# Patient Record
Sex: FEMALE | Race: WHITE | Employment: FULL TIME | URBAN - NONMETROPOLITAN AREA
[De-identification: names, ages, dates, MRNs, and addresses within clinical notes are randomized per-mention and may not be internally consistent; named-entity substitution may affect disease eponyms.]

---

## 2017-09-06 ENCOUNTER — HOSPITAL ENCOUNTER (OUTPATIENT)
Dept: NON INVASIVE DIAGNOSTICS | Age: 52
Discharge: HOME | End: 2017-09-06
Payer: COMMERCIAL

## 2017-09-06 PROCEDURE — 93005 ELECTROCARDIOGRAM TRACING: CPT

## 2017-09-08 LAB
EKG P AXIS: 47 DEGREES
EKG P-R INTERVAL: 148 MS
EKG Q-T INTERVAL: 345 MS
EKG QRS DURATION: 77 MS
EKG QTC CALCULATION (BAZETT): 415 MS
EKG T AXIS: 76 DEGREES

## 2017-10-10 ENCOUNTER — HOSPITAL ENCOUNTER (OUTPATIENT)
Dept: NON INVASIVE DIAGNOSTICS | Age: 52
Discharge: HOME | End: 2017-10-10
Payer: COMMERCIAL

## 2017-10-10 VITALS
HEART RATE: 99 BPM | BODY MASS INDEX: 29.28 KG/M2 | DIASTOLIC BLOOD PRESSURE: 74 MMHG | WEIGHT: 149.91 LBS | SYSTOLIC BLOOD PRESSURE: 113 MMHG

## 2017-10-10 PROCEDURE — 93350 STRESS TTE ONLY: CPT

## 2017-10-10 PROCEDURE — 6360000002 HC RX W HCPCS

## 2017-10-10 PROCEDURE — 6360000002 HC RX W HCPCS: Performed by: INTERNAL MEDICINE

## 2017-10-10 PROCEDURE — 2580000003 HC RX 258: Performed by: INTERNAL MEDICINE

## 2017-10-10 RX ORDER — SODIUM CHLORIDE 9 MG/ML
INJECTION, SOLUTION INTRAVENOUS
Status: COMPLETED | OUTPATIENT
Start: 2017-10-10 | End: 2017-10-10

## 2017-10-10 RX ORDER — ATROPINE SULFATE 0.1 MG/ML
1 INJECTION INTRAVENOUS PRN
Status: ACTIVE | OUTPATIENT
Start: 2017-10-10 | End: 2017-10-11

## 2017-10-10 RX ORDER — SODIUM CHLORIDE 0.9 % (FLUSH) 0.9 %
10 SYRINGE (ML) INJECTION PRN
Status: ACTIVE | OUTPATIENT
Start: 2017-10-10 | End: 2017-10-11

## 2017-10-10 RX ORDER — DOBUTAMINE HYDROCHLORIDE 200 MG/100ML
10 INJECTION INTRAVENOUS CONTINUOUS PRN
Status: DISCONTINUED | OUTPATIENT
Start: 2017-10-10 | End: 2017-10-10 | Stop reason: ALTCHOICE

## 2017-10-10 RX ADMIN — Medication 10 ML: at 08:46

## 2017-10-10 RX ADMIN — SODIUM CHLORIDE 250 ML: 9 INJECTION, SOLUTION INTRAVENOUS at 08:46

## 2017-10-10 RX ADMIN — DOBUTAMINE HYDROCHLORIDE 10 MCG/KG/MIN: 200 INJECTION INTRAVENOUS at 09:02

## 2017-10-10 RX ADMIN — ATROPINE SULFATE 1 MG: 0.1 INJECTION PARENTERAL at 09:10

## 2017-10-12 PROBLEM — R94.31 ABNORMAL EKG: Status: ACTIVE | Noted: 2017-10-12

## 2017-10-17 ENCOUNTER — SURG/PROC ORDERS (OUTPATIENT)
Dept: CARDIOLOGY | Age: 52
End: 2017-10-17

## 2017-10-17 ENCOUNTER — HOSPITAL ENCOUNTER (OUTPATIENT)
Dept: GENERAL RADIOLOGY | Age: 52
Discharge: HOME | End: 2017-10-17
Payer: COMMERCIAL

## 2017-10-17 ENCOUNTER — HOSPITAL ENCOUNTER (OUTPATIENT)
Dept: LAB | Age: 52
Discharge: HOME | End: 2017-10-17
Payer: COMMERCIAL

## 2017-10-17 ENCOUNTER — OFFICE VISIT (OUTPATIENT)
Dept: CARDIOLOGY | Age: 52
End: 2017-10-17
Payer: COMMERCIAL

## 2017-10-17 VITALS
WEIGHT: 144 LBS | BODY MASS INDEX: 28.27 KG/M2 | DIASTOLIC BLOOD PRESSURE: 72 MMHG | HEART RATE: 72 BPM | HEIGHT: 60 IN | SYSTOLIC BLOOD PRESSURE: 130 MMHG

## 2017-10-17 DIAGNOSIS — R94.31 ABNORMAL EKG: ICD-10-CM

## 2017-10-17 DIAGNOSIS — I10 ESSENTIAL HYPERTENSION: ICD-10-CM

## 2017-10-17 DIAGNOSIS — E78.2 MIXED HYPERLIPIDEMIA: ICD-10-CM

## 2017-10-17 DIAGNOSIS — R94.31 ABNORMAL EKG: Primary | ICD-10-CM

## 2017-10-17 DIAGNOSIS — R94.39 ABNORMAL DOBUTAMINE STRESS ECHO: Primary | ICD-10-CM

## 2017-10-17 LAB
ANION GAP SERPL CALCULATED.3IONS-SCNC: 16 MMOL/L (ref 7–19)
BASOPHILS ABSOLUTE: 0.1 K/UL (ref 0–0.2)
BASOPHILS RELATIVE PERCENT: 0.4 % (ref 0–1)
BUN BLDV-MCNC: 15 MG/DL (ref 6–20)
CALCIUM SERPL-MCNC: 9.7 MG/DL (ref 8.6–10)
CHLORIDE BLD-SCNC: 98 MMOL/L (ref 98–111)
CO2: 25 MMOL/L (ref 22–29)
CREAT SERPL-MCNC: 0.6 MG/DL (ref 0.5–0.9)
EOSINOPHILS ABSOLUTE: 0.2 K/UL (ref 0–0.6)
EOSINOPHILS RELATIVE PERCENT: 1.6 % (ref 0–5)
GFR NON-AFRICAN AMERICAN: >60
GLUCOSE BLD-MCNC: 88 MG/DL (ref 74–109)
HCT VFR BLD CALC: 42.4 % (ref 37–47)
HEMOGLOBIN: 14 G/DL (ref 12–16)
LYMPHOCYTES ABSOLUTE: 3.8 K/UL (ref 1.1–4.5)
LYMPHOCYTES RELATIVE PERCENT: 31 % (ref 20–40)
MCH RBC QN AUTO: 28.3 PG (ref 27–31)
MCHC RBC AUTO-ENTMCNC: 33 G/DL (ref 33–37)
MCV RBC AUTO: 85.8 FL (ref 81–99)
MONOCYTES ABSOLUTE: 0.7 K/UL (ref 0–0.9)
MONOCYTES RELATIVE PERCENT: 5.6 % (ref 0–10)
NEUTROPHILS ABSOLUTE: 7.5 K/UL (ref 1.5–7.5)
NEUTROPHILS RELATIVE PERCENT: 60.9 % (ref 50–65)
PDW BLD-RTO: 14 % (ref 11.5–14.5)
PLATELET # BLD: 325 K/UL (ref 130–400)
PMV BLD AUTO: 10.8 FL (ref 9.4–12.3)
POTASSIUM SERPL-SCNC: 4.2 MMOL/L (ref 3.5–5)
RBC # BLD: 4.94 M/UL (ref 4.2–5.4)
SODIUM BLD-SCNC: 139 MMOL/L (ref 136–145)
WBC # BLD: 12.3 K/UL (ref 4.8–10.8)

## 2017-10-17 PROCEDURE — 71020 XR CHEST STANDARD TWO VW: CPT

## 2017-10-17 PROCEDURE — 99205 OFFICE O/P NEW HI 60 MIN: CPT | Performed by: INTERNAL MEDICINE

## 2017-10-17 RX ORDER — ATORVASTATIN CALCIUM 40 MG/1
40 TABLET, FILM COATED ORAL DAILY
Status: ON HOLD | COMMUNITY
Start: 2017-09-24 | End: 2018-09-06

## 2017-10-17 NOTE — PROGRESS NOTES
recall    [place] EGD TRANSORAL BIOPSY SINGLE/MULTIPLE N/A 11/17/2016    Dr Iesha Arambula-Gastric ulcers, moderate chronic active duodenitis, gastritis, esophagogastritis, gastropathy     Family History   Problem Relation Age of Onset    Adopted: Yes    Colon Cancer Neg Hx     Colon Polyps Neg Hx     Esophageal Cancer Neg Hx     Stomach Cancer Neg Hx     Rectal Cancer Neg Hx     Liver Cancer Neg Hx     Liver Disease Neg Hx      Social History   Substance Use Topics    Smoking status: Current Every Day Smoker     Packs/day: 1.00     Years: 30.00     Types: Cigarettes    Smokeless tobacco: Not on file    Alcohol use No          Review of Systems:    General:      Complaint / Symptom Yes / No / Description if Yes       Fatigue No   Weight gain No   Insomnia No       Respiratory:        Complaint / Symptom Yes / No / Description if Yes       Cough No   Horseness No       Cardiovascular:    Complaint / Symptom Yes / No / Description if Yes       Chest Pain Yes: off an on   Shortness of Air / Orthopnea No   Presyncope / Syncope No   Palpitations No         Objective:    /72   Pulse 72   Ht 5' (1.524 m)   Wt 144 lb (65.3 kg)   BMI 28.12 kg/m²     GENERAL - well developed and well nourished, conversant  HEENT   PERRLA, Hearing appears normal  NECK - no thyromegaly, no JVD, trachea is in the midline  CARDIOVASCULAR  PMI is in the mid line clavicular position, Normal S1 and S2 with a grade 1/6 systolic murmur. No S3 or S4    PULMONARY  no respiratory distress. No wheezes or rales.  Lungs are clear to ausculation   ABDOMEN   soft, non tender, no rebound  MUSCULOSKELETAL   range of motion of the upper and lower extermites appears normal and equal and is without pain   EXTREMITIES - no significant edema   NEUROLOGIC  gait and station are normal  SKIN - turgor is normal  PSYCHIATRIC - normal mood and affect, alert and orientated x 3,      ASSESSMENT:    ALL THE CARDIOLOGY PROBLEMS ARE LISTED ABOVE; HOWEVER, THE FOLLOWING SPECIFIC CARDIAC PROBLEMS / CONDITIONS WERE ADDRESSED AND TREATED DURING THE OFFICE VISIT TODAY:                                                                                            MEDICAL DECISION MAKING             Cardiac Specific Problem / Diagnosis  Discussion and Data Reviewed Diagnostic Procedures Ordered Management Options Selected           1. HTN  show no change   Review and summation of old records:    Patient has a history of these risk factors, which are managed medically, and are on current oral therapy  I personally addressed, counselled and educated the patient on this problem / risk factor. I will personally continue and manage prescribed medications and monitor the course of the therapy. No Continue current medications:     Yes           2. Chest pain Are worsening   Patient is having chest pain off and on. No Continue current medications:    {Yes           3. Abnormal EKG  show no change   10/10/17 DSE Dobutamine stress echocardiogram with clinical evidence of myocardial ischemia. Yes; will schedule heart cath for Monday @ Continue current medications:       Yes         Discussed with patient. Follow Up Visit Scheduled for:  As scheduled after heart cath    I greatly appreciate the opportunity to meet Phuong Starr and your confidence in allowing me to participate in her cardiovascular care. [facility] [place], [place] am scribing for and in the presence of J. Deniece Kayser, MD,FACC.     Daryle Side, MA     11:25 AM

## 2017-10-17 NOTE — PATIENT INSTRUCTIONS
[place] at the [address], [facility] and [address] located on the first floor of [place] [address] through hospital main entrance and turn immediately to your left. Date/Time: Monday @ 7:30    Pre-operative work-up:  CBC, BMP, and Chest x-ray. Allergies:  Review of patient's allergies indicates no known allergies. Contact number:  [phone] (home)     Cardiac Catheterization Instructions   · Do not eat or drink anything after midnight on the night before your procedure. You can take your morning medications with a sip of water unless otherwise directed not to. · Bring a list of the names and dosages of all the medications you are taking. · Diabetic medication should be stopped two days prior: Metformin (glucophage), Invokana (canagliflozin), Farxiga (dapagliflozin), Jardiance (empagliflozin)   · Coumadin (warfarin) should be stopped two days prior to this procedure. · Pradaxa (dabigatran) should be stopped one day prior to procedure. · You should arrange to have someone take you home rather than drive yourself. · Further plan will depend upon the result of the cardiac catheterization. If for any reason you are unable to keep this appointment, please contact [facility], [phone], as soon as possible to reschedule.  -------------------------------------------------------------------------------------------------------------------  Cardiac Catheterization   (Coronary Angiography; Coronary Arteriography; Coronary Angiogram)   Definition:  Cardiac catheterization is a test that uses a catheter (tube) and x-ray machine to assess the heart and its blood supply. Reasons for Procedure   It is used to find the cause of symptoms, like chest pain, that could mean heart problems. Cardiac catheterization helps doctors to:    Identify narrowed or clogged arteries of the heart   Measure blood pressure within the heart   Evaluate how well the heart valves and days     Postoperative Care At the [facility]   EKG and blood studies may be done. You will likely need to lie still and flat on your back for a period of time. A pressure dressing may be placed over the area where the catheter was inserted to help prevent bleeding. It is important to follow the nurse's directions. At Home   When you return home, do the following to help ensure a smooth recovery:   Do not drive until your doctor says it is okay. Do not lift heavy objects or engage in strenuous exercise or sexual activity for at least 5-7 days. Change the dressing around the incision area as instructed. Your doctor will explain to you which medicines you can take and which ones to avoid. Take medicines as instructed. Ice may help decrease discomfort at the insertion site. You may apply the ice for 15-20 minutes each hour, for the first few days. To lower your risk for further complications of heart disease, you can make lifestyle changes. These include eating a healthier diet, exercising regularly, and managing stress. Ask your doctor about when it is safe to shower, bathe, or soak in water. Be sure to follow your doctor's instructions . After arriving home, contact your doctor if any of the following occurs:   Signs of infection, including fever and chills   Extreme sweating, nausea, or vomiting   Change in sensation to affected leg, including numbness, feeling cold, or change in color   Redness, swelling, increasing pain, excessive bleeding, or discharge at point of catheter insertion   Cough, shortness of breath, or difficulty breathing   Extreme pain   Chest pain   Drooping facial muscles   Changes in vision or speech   Difficulty walking or using your limbs   In case of an emergency, Call 911.

## 2017-10-17 NOTE — PROGRESS NOTES
[facility] [place] [address] [address], [address] [address]  Phone: [phone]  Fax: [phone] Complaint / Reason for Being Seen:  Abnormal cardiac tesing    Subjective:  Patient is referred for a abnormal DSE. Patient in the past has had abnormal EKG also. During DSE patient experienced chest pain. She has off and on chest pain still. Today a heart cath will be scheduled. Old records have been obtained from the referring providers. Those records have been reviewed and summarized. Lidya Starr is a 47 y.o. female with the following history as recorded in [facility]:  Patient Active Problem List    Diagnosis Date Noted    Abnormal EKG 10/12/2017    Acute gastric ulcer     Screening for colon cancer 09/23/2016    Chronic heartburn 09/23/2016     Current Outpatient Prescriptions   Medication Sig Dispense Refill    aspirin 81 MG tablet Take 81 mg by mouth daily      atorvastatin (LIPITOR) 40 MG tablet       omeprazole (PRILOSEC) 40 MG delayed release capsule Take 1 capsule by mouth 2 times daily (before meals) Take first thing daily on an empty stomach. 30 capsule 3    cloNIDine (CATAPRES) 0.1 MG tablet       clopidogrel (PLAVIX) 75 MG tablet       lisinopril-hydrochlorothiazide (PRINZIDE;ZESTORETIC) 20-12.5 MG per tablet       metFORMIN (GLUCOPHAGE) 500 MG tablet        No current facility-administered medications for this visit. Allergies: Review of patient's allergies indicates no known allergies.   Past Medical History:   Diagnosis Date    Anticoagulant long-term use     Diabetes mellitus ([address]     pre diabetic    Hypertension      Past Surgical History:   Procedure Laterality Date    OTHER SURGICAL HISTORY      Femoral Artery stent    ME COLONOSCOPY FLX DX W/COLLJ SPEC WHEN PFRMD N/A 11/17/2016    Dr Twyla Andersen, 10 yr recall    [place] EGD TRANSORAL BIOPSY SINGLE/MULTIPLE N/A 11/17/2016    Dr Coco Arambula-Gastric ulcers, moderate chronic active duodenitis, gastritis, esophagogastritis, gastropathy     Family History   Problem Relation Age of Onset    Adopted: Yes    Colon Cancer Neg Hx     Colon Polyps Neg Hx     Esophageal Cancer Neg Hx     Stomach Cancer Neg Hx     Rectal Cancer Neg Hx     Liver Cancer Neg Hx     Liver Disease Neg Hx      Social History   Substance Use Topics    Smoking status: Current Every Day Smoker     Packs/day: 1.00     Years: 30.00     Types: Cigarettes    Smokeless tobacco: Not on file    Alcohol use No          Review of System:      Except as noted in HPI, cardiovascular and respiratory systems are otherwise negative. Objective:    /72   Pulse 72   Ht 5' (1.524 m)   Wt 144 lb (65.3 kg)   BMI 28.12 kg/m²     GENERAL - well developed and well nourished    HEENT   PERRLA, Hearing appears normal  NECK - no thyromegaly, no JVD, trachea is in the midline  CARDIOVASCULAR  PMI is in the mid line clavicular position, Normal S1 and S2 with a grade 1/6 systolic murmur. No S3 or S4    PULMONARY  no respiratory distress. No wheezes or rales. Lungs are clear to ausculation   ABDOMEN   soft, non tender, no rebound  MUSCULOSKELETAL   range of motion of the upper and lower extermites appears normal and equal and is without pain   EXTREMITIES - no significant edema   NEUROLOGIC  gait and station are normal  SKIN - turgor is normal  PSYCHIATRIC - normal mood and affect, alert and orientated x 3,      ASSESSMENT:    ALL THE CARDIOLOGY PROBLEMS ARE LISTED ABOVE; HOWEVER, THE FOLLOWING SPECIFIC CARDIAC PROBLEMS / CONDITIONS WERE ADDRESSED AND TREATED DURING THE OFFICE VISIT TODAY:       Cardiac Specific Problem  Discussion and Plan         1. Abnormal DSE  show no change   10/10/17 DSE Dobutamine stress echocardiogram with clinical evidence of myocardial ischemia.          2. HTN  show no change   Patient has a history of these risk factors, which are managed medically, and are on current oral therapy  I personally

## 2017-10-18 RX ORDER — SODIUM CHLORIDE 9 MG/ML
INJECTION, SOLUTION INTRAVENOUS CONTINUOUS
Status: CANCELLED | OUTPATIENT
Start: 2017-10-18

## 2017-10-23 ENCOUNTER — HOSPITAL ENCOUNTER (OUTPATIENT)
Dept: CARDIAC CATH/INVASIVE PROCEDURES | Age: 52
Discharge: HOME | End: 2017-10-23
Attending: INTERNAL MEDICINE | Admitting: INTERNAL MEDICINE
Payer: COMMERCIAL

## 2017-10-23 VITALS
TEMPERATURE: 98.2 F | DIASTOLIC BLOOD PRESSURE: 71 MMHG | WEIGHT: 147 LBS | SYSTOLIC BLOOD PRESSURE: 116 MMHG | BODY MASS INDEX: 28.86 KG/M2 | HEIGHT: 60 IN | RESPIRATION RATE: 16 BRPM | HEART RATE: 77 BPM | OXYGEN SATURATION: 90 %

## 2017-10-23 DIAGNOSIS — R94.39 ABNORMAL DOBUTAMINE STRESS ECHO: ICD-10-CM

## 2017-10-23 PROBLEM — R07.89 CHEST PRESSURE: Status: ACTIVE | Noted: 2017-10-23

## 2017-10-23 PROCEDURE — 99024 POSTOP FOLLOW-UP VISIT: CPT | Performed by: INTERNAL MEDICINE

## 2017-10-23 PROCEDURE — 93458 L HRT ARTERY/VENTRICLE ANGIO: CPT | Performed by: INTERNAL MEDICINE

## 2017-10-23 PROCEDURE — 2500000003 HC RX 250 WO HCPCS

## 2017-10-23 PROCEDURE — 2720000001 HC MISC SURG SUPPLY STERILE $51-500

## 2017-10-23 PROCEDURE — C1769 GUIDE WIRE: HCPCS

## 2017-10-23 PROCEDURE — 2580000003 HC RX 258: Performed by: INTERNAL MEDICINE

## 2017-10-23 PROCEDURE — 2709999900 HC NON-CHARGEABLE SUPPLY

## 2017-10-23 PROCEDURE — C1894 INTRO/SHEATH, NON-LASER: HCPCS

## 2017-10-23 PROCEDURE — C1887 CATHETER, GUIDING: HCPCS

## 2017-10-23 PROCEDURE — 6360000002 HC RX W HCPCS

## 2017-10-23 RX ORDER — SODIUM CHLORIDE 9 MG/ML
INJECTION, SOLUTION INTRAVENOUS CONTINUOUS
Status: DISCONTINUED | OUTPATIENT
Start: 2017-10-23 | End: 2017-10-23 | Stop reason: SDUPTHER

## 2017-10-23 RX ORDER — SODIUM CHLORIDE 9 MG/ML
INJECTION, SOLUTION INTRAVENOUS CONTINUOUS
Status: ACTIVE | OUTPATIENT
Start: 2017-10-23 | End: 2017-10-23

## 2017-10-23 RX ADMIN — SODIUM CHLORIDE: 9 INJECTION, SOLUTION INTRAVENOUS at 09:35

## 2017-10-23 NOTE — H&P
[facility] [place] [address]  Phone: [phone]  Fax: [phone]        Chief Complaint / Reason for Being Seen:  Abnormal cardiac tesing     Subjective:  Patient is referred for a abnormal DSE. Patient in the past has had abnormal EKG also. During DSE patient experienced chest pain. She has off and on chest pain still. Today a heart cath will be scheduled. Old records have been obtained from the referring providers. Those records have been reviewed and summarized.       Phuong Starr is a 47 y.o. female with the following [facility] as [facility] in [facility]:       Patient Active Problem List     Diagnosis Date Noted    Abnormal EKG 10/12/2017    Acute gastric ulcer      Screening for colon cancer 09/23/2016    Chronic heartburn 09/23/2016      Current Facility-Administered Medications          Current Outpatient Prescriptions   Medication Sig Dispense Refill    aspirin 81 MG tablet Take 81 mg by mouth daily        atorvastatin (LIPITOR) 40 MG tablet          omeprazole (PRILOSEC) 40 MG delayed release capsule Take 1 capsule by mouth 2 times daily (before meals) Take first thing daily on an empty stomach. 30 capsule 3    cloNIDine (CATAPRES) 0.1 MG tablet          clopidogrel (PLAVIX) 75 MG tablet          lisinopril-hydrochlorothiazide (PRINZIDE;ZESTORETIC) 20-12.5 MG per tablet          metFORMIN (GLUCOPHAGE) 500 MG tablet            No current facility-administered medications for this visit.          Allergies: Review of patient's allergies indicates no known allergies.   Past Medical History        Past Medical History:   Diagnosis Date    Anticoagulant long-term use      Diabetes mellitus ([address]       pre diabetic    Hypertension           Past Surgical History         Past Surgical History:   Procedure Laterality Date    OTHER SURGICAL HISTORY         Femoral Artery stent    [place] COLONOSCOPY FLX DX W/COLLJ SPEC WHEN PFRMD N/A 11/17/2016     Dr Luke Mcrae, 10 yr recall    [place] EGD TRANSORAL BIOPSY SINGLE/MULTIPLE N/A 11/17/2016     Dr Nelson Arambula-Gastric ulcers, moderate chronic active duodenitis, gastritis, esophagogastritis, gastropathy         Family History         Family History   Problem Relation Age of Onset    Adopted: Yes    Colon Cancer Neg Hx      Colon Polyps Neg Hx      Esophageal Cancer Neg Hx      Stomach Cancer Neg Hx      Rectal Cancer Neg Hx      Liver Cancer Neg Hx      Liver Disease Neg Hx                 Social History   Substance Use Topics    Smoking status: Current Every Day Smoker       Packs/day: 1.00       Years: 30.00       Types: Cigarettes    Smokeless tobacco: Not on file    Alcohol use No            Review of System:        Except as noted in HPI, cardiovascular and respiratory systems are otherwise negative.        Objective:     /72   Pulse 72   Ht 5' (1.524 m)   Wt 144 lb (65.3 kg)   BMI 28.12 kg/m²      GENERAL - well developed and well nourished    HEENT -  PERRLA, Hearing appears normal  NECK - no thyromegaly, no JVD, trachea is in the midline  CARDIOVASCULAR - PMI is in the mid line clavicular position, Normal S1 and S2 with a grade 1/6 systolic murmur. No S3 or S4    PULMONARY - no respiratory distress. No wheezes or rales. Lungs are clear to ausculation   ABDOMEN  - soft, non tender, no rebound  MUSCULOSKELETAL  - range of motion of the upper and lower extermites appears normal and equal and is without pain   EXTREMITIES - no significant edema   NEUROLOGIC - gait and station are normal  SKIN - turgor is normal  PSYCHIATRIC - normal mood and affect, alert and orientated x 3,        ASSESSMENT:     ALL THE CARDIOLOGY PROBLEMS ARE LISTED ABOVE; HOWEVER, THE FOLLOWING SPECIFIC CARDIAC PROBLEMS / CONDITIONS WERE ADDRESSED AND TREATED DURING THE OFFICE VISIT TODAY:          Cardiac Specific Problem   Discussion and Plan             1.  Abnormal DSE  show no change 10/10/17 DSE Dobutamine stress echocardiogram with clinical evidence of myocardial ischemia.             2. HTN  show no change Patient has a history of these risk factors, which are managed medically, and are on current oral therapy  I personally addressed, counselled and educated the patient on this problem / risk factor. I will personally continue and manage prescribed medications and monitor the course of the therapy.             3. Chest pain in adult  are worsening Patient is still having episodes of chest pain off and on. Patient scheduled for heart cath on Monday @ 7:30 AM. Pre-op was ordered today.         PLAN:     1. Continue present medications except for changes as noted above  2. Continue to monitor rhythm  3. Further orders per clinical course.      Discussed with patient.     I greatly appreciate the opportunity to meet Phuong Starr and your confidence in allowing me to participate in her cardiovascular care.  Catalina Tsai. [facility] [place], [place] am scribing for and in the presence of AMADEO Badillo MD,[facility].     Clifford Jeffers MA      11:39 AM     I, AMADEO Badillo MD, [facility], personally performed the services described in this documentation as scribed by Clifford Jeffers in my presence, and it is both accurate and complete.     Electronically signed by AMADEO Badillo MD, [facility]     10/18/17 9:21 AM     10/23/2017       Proposed Procedure:  Selective left heart and coronary arteriography, (radial approach), 10/23/17      :  AMADEO Badillo MD    Indications:  10/10/2017  DSE Positive for clinical myocardial ischemia, discordant risk findings, AUC indication 20, AUC score 7      I have discussed the risks, benefits and options with the patient and her family. They appear to understand, have no questions, and wish to proceed. This procedure is scheduled for today.       Electronically signed by Christy Jimenez MD on 10/23/17

## 2017-10-23 NOTE — DISCHARGE SUMMARY
uneventful. She was discharged home on medical therapy with outpatient follow up. Consults:     None      Significant Diagnostic Studies:    1. Selective left heart and coronary arteriography, (radial approach), 10/23/17      Significant Therapeutic Endeavors:      1. none      Activity: activity as directed per discharge instructions. Diet:  Cardiac diet    Labs: For convenience and continuity at follow-up the following most recent labs are provided:    CBC:    Lab Results   Component Value Date    WBC 12.3 10/17/2017    HGB 14.0 10/17/2017    HCT 42.4 10/17/2017     10/17/2017       Renal:    Lab Results   Component Value Date     10/17/2017    K 4.2 10/17/2017    CL 98 10/17/2017    CO2 25 10/17/2017    BUN 15 10/17/2017    CREATININE 0.6 10/17/2017    CALCIUM 9.7 10/17/2017         Discharge Medications:     Current Discharge Medication List           Details   aspirin 81 MG tablet Take 81 mg by mouth daily      atorvastatin (LIPITOR) 40 MG tablet       omeprazole (PRILOSEC) 40 MG delayed release capsule Take 1 capsule by mouth 2 times daily (before meals) Take first thing daily on an empty stomach. Qty: 30 capsule, Refills: 3      cloNIDine (CATAPRES) 0.1 MG tablet       clopidogrel (PLAVIX) 75 MG tablet       lisinopril-hydrochlorothiazide (PRINZIDE;ZESTORETIC) 20-12.5 MG per tablet       metFORMIN (GLUCOPHAGE) 500 MG tablet                  Disposition:      1. Reassurance  2. Risk factor modification  3. Evaluation of etiologies of non cardiac chest discomfort should symptoms persist or progress  4. Follow  Up with Primary care provider as arranged  5.   Follow up with Cardiology \"prn\"       Electronically signed by Yash Dupree MD on 10/23/17

## 2018-07-16 ENCOUNTER — TELEPHONE (OUTPATIENT)
Dept: CARDIOLOGY | Age: 53
End: 2018-07-16

## 2018-07-18 ENCOUNTER — OFFICE VISIT (OUTPATIENT)
Dept: CARDIOLOGY | Age: 53
End: 2018-07-18
Payer: COMMERCIAL

## 2018-07-18 VITALS
SYSTOLIC BLOOD PRESSURE: 106 MMHG | HEIGHT: 59 IN | DIASTOLIC BLOOD PRESSURE: 62 MMHG | HEART RATE: 110 BPM | WEIGHT: 145 LBS | BODY MASS INDEX: 29.23 KG/M2

## 2018-07-18 DIAGNOSIS — F17.210 CIGARETTE NICOTINE DEPENDENCE WITHOUT COMPLICATION: ICD-10-CM

## 2018-07-18 DIAGNOSIS — I10 ESSENTIAL HYPERTENSION: ICD-10-CM

## 2018-07-18 DIAGNOSIS — R07.9 CHEST PAIN IN ADULT: Primary | ICD-10-CM

## 2018-07-18 DIAGNOSIS — E78.2 MIXED HYPERLIPIDEMIA: ICD-10-CM

## 2018-07-18 DIAGNOSIS — E11.8 TYPE 2 DIABETES MELLITUS WITH COMPLICATION, WITHOUT LONG-TERM CURRENT USE OF INSULIN (HCC): ICD-10-CM

## 2018-07-18 DIAGNOSIS — R00.0 TACHYCARDIA: ICD-10-CM

## 2018-07-18 DIAGNOSIS — I25.10 MILD CAD: ICD-10-CM

## 2018-07-18 PROCEDURE — G8427 DOCREV CUR MEDS BY ELIG CLIN: HCPCS | Performed by: CLINICAL NURSE SPECIALIST

## 2018-07-18 PROCEDURE — 99214 OFFICE O/P EST MOD 30 MIN: CPT | Performed by: CLINICAL NURSE SPECIALIST

## 2018-07-18 PROCEDURE — 93000 ELECTROCARDIOGRAM COMPLETE: CPT | Performed by: CLINICAL NURSE SPECIALIST

## 2018-07-18 PROCEDURE — 4004F PT TOBACCO SCREEN RCVD TLK: CPT | Performed by: CLINICAL NURSE SPECIALIST

## 2018-07-18 PROCEDURE — 2022F DILAT RTA XM EVC RTNOPTHY: CPT | Performed by: CLINICAL NURSE SPECIALIST

## 2018-07-18 PROCEDURE — 99406 BEHAV CHNG SMOKING 3-10 MIN: CPT | Performed by: CLINICAL NURSE SPECIALIST

## 2018-07-18 PROCEDURE — G8598 ASA/ANTIPLAT THER USED: HCPCS | Performed by: CLINICAL NURSE SPECIALIST

## 2018-07-18 PROCEDURE — G8419 CALC BMI OUT NRM PARAM NOF/U: HCPCS | Performed by: CLINICAL NURSE SPECIALIST

## 2018-07-18 PROCEDURE — 3017F COLORECTAL CA SCREEN DOC REV: CPT | Performed by: CLINICAL NURSE SPECIALIST

## 2018-07-18 PROCEDURE — 3046F HEMOGLOBIN A1C LEVEL >9.0%: CPT | Performed by: CLINICAL NURSE SPECIALIST

## 2018-07-18 RX ORDER — CITALOPRAM 10 MG/1
10 TABLET ORAL DAILY
Status: ON HOLD | COMMUNITY
End: 2018-09-06

## 2018-07-18 ASSESSMENT — ENCOUNTER SYMPTOMS
NAUSEA: 0
SHORTNESS OF BREATH: 1
BLURRED VISION: 0
HEARTBURN: 0
COUGH: 1
VOMITING: 0
ORTHOPNEA: 0

## 2018-07-18 NOTE — PROGRESS NOTES
hypertension     5. Mixed hyperlipidemia     6. Type 2 diabetes mellitus with complication, without long-term current use of insulin (HCC)     7. Cigarette nicotine dependence without complication       EKG shows sinus tachycardia rate 106    Heart Cath 10/17  Summary:     1.  Successful radial artery cardiac catheterization  2. Mild coronary artery disease  3. Left ventricular function is normal    Patient with episode of chest pain. Heart cath in October with mild CAD. Plan will be to repeat a dobutamine stress echo for reassurance that this is not myocardial ischemia. She is tachycardic today, EKG from [facility] does not show that. Reviewed records from [facility]. See the media tab. Plan will be to check a 48 hour Holter monitor to assess for any arrhythmias that may be causing chest discomfort. She is doing a good job controlling her blood pressure, cholesterol, and diabetes. Her main issue is that she continues to smoke and everyone in her family also smokes. Instructed patient in smoking cessation rationales, strategies, and available resources x 4 minutes. She would consider Chantix in the future    Plan    Orders Placed This Encounter   Procedures    Holter Monitor 48 Hour     Order Specific Question:   Reason for Exam?     Answer: Tachycardia    EKG 12 lead     Order Specific Question:   Reason for Exam?     Answer:   Chest pain    ECHO Pharmacological Stress Test     Standing Status:   Future     Standing Expiration Date:   7/18/2019     Order Specific Question:   Reason for exam:     Answer:   other chest pain     Return in about 4 weeks (around 8/15/2018) for APRN. Quit smoking. Call the [address] [place] [address] [phone]  Dobutamine Stress Echo  48 hour Holter monitor     Call with any questions or concerns  Follow up with Rubi Mahoney MD for non cardiac problems  Report any new problems  Cardiovascular Fitness-Exercise as tolerated.   Strive for 15 minutes of

## 2018-07-18 NOTE — PATIENT INSTRUCTIONS
[place] at the [address], [facility] and [address] [facility] located on the first floor of [place] [address] through hospital main entrance and turn immediately to your left. Patient's contact number:  [phone] (home)     Date/Time:     Dobutamine Stress Test    A chemical stress test uses chemical agents injected into the body through the vein. These chemicals make the heart function as if it were under stress. A chemical stress test is used when a traditional stress test (called a cardiac stress test) cannot be done. Testing will take approximately one hour. Dobutamine Stress Echocardiogram Instructions:   No caffeine 24 hours prior to the testing. This includes: coffee, pop/soda, chocolate, cold medications, etc.  Any product that might contain caffeine. Do not eat or drink anything, except water, eight (8) hours before the test.   No alcohol or nicotine twelve (12) hours prior to your test.   Wear comfortable clothing. If you are taking metoprolol, stop morning of this procedure. If you need to change this appointment, please call outpatient scheduling at [phone].

## 2018-08-01 ENCOUNTER — TELEPHONE (OUTPATIENT)
Dept: CARDIOLOGY | Age: 53
End: 2018-08-01

## 2018-08-02 DIAGNOSIS — I10 ESSENTIAL HYPERTENSION: Primary | ICD-10-CM

## 2018-08-02 RX ORDER — VARENICLINE TARTRATE 25 MG
KIT ORAL
Qty: 1 EACH | Refills: 0 | Status: SHIPPED | OUTPATIENT
Start: 2018-08-02

## 2018-08-02 RX ORDER — METOPROLOL SUCCINATE 25 MG/1
25 TABLET, EXTENDED RELEASE ORAL DAILY
Qty: 30 TABLET | Refills: 3 | Status: SHIPPED | OUTPATIENT
Start: 2018-08-02

## 2018-08-02 RX ORDER — VARENICLINE TARTRATE 1 MG/1
1 TABLET, FILM COATED ORAL 2 TIMES DAILY
Qty: 60 TABLET | Refills: 3 | Status: SHIPPED | OUTPATIENT
Start: 2018-08-02

## 2018-08-22 ENCOUNTER — HOSPITAL ENCOUNTER (OUTPATIENT)
Dept: NON INVASIVE DIAGNOSTICS | Age: 53
Discharge: HOME | End: 2018-08-22
Payer: COMMERCIAL

## 2018-08-22 ENCOUNTER — HOSPITAL ENCOUNTER (OUTPATIENT)
Dept: NUCLEAR MEDICINE | Age: 53
Discharge: HOME | End: 2018-08-24
Payer: COMMERCIAL

## 2018-08-22 DIAGNOSIS — R07.89 OTHER CHEST PAIN: ICD-10-CM

## 2018-08-22 PROCEDURE — 3430000000 HC RX DIAGNOSTIC RADIOPHARMACEUTICAL: Performed by: INTERNAL MEDICINE

## 2018-08-22 PROCEDURE — 6360000002 HC RX W HCPCS: Performed by: INTERNAL MEDICINE

## 2018-08-22 PROCEDURE — A9500 TC99M SESTAMIBI: HCPCS | Performed by: INTERNAL MEDICINE

## 2018-08-22 PROCEDURE — 93017 CV STRESS TEST TRACING ONLY: CPT

## 2018-08-22 PROCEDURE — 78452 HT MUSCLE IMAGE SPECT MULT: CPT

## 2018-08-22 RX ADMIN — TETRAKIS(2-METHOXYISOBUTYLISOCYANIDE)COPPER(I) TETRAFLUOROBORATE 30 MILLICURIE: 1 INJECTION, POWDER, LYOPHILIZED, FOR SOLUTION INTRAVENOUS at 11:37

## 2018-08-22 RX ADMIN — TETRAKIS(2-METHOXYISOBUTYLISOCYANIDE)COPPER(I) TETRAFLUOROBORATE 10 MILLICURIE: 1 INJECTION, POWDER, LYOPHILIZED, FOR SOLUTION INTRAVENOUS at 11:37

## 2018-08-22 RX ADMIN — REGADENOSON 0.4 MG: 0.08 INJECTION, SOLUTION INTRAVENOUS at 11:37

## 2018-08-23 ENCOUNTER — TELEPHONE (OUTPATIENT)
Dept: CARDIOLOGY | Age: 53
End: 2018-08-23

## 2018-08-23 LAB
LV EF: 83 %
LVEF MODALITY: NORMAL

## 2018-08-23 NOTE — TELEPHONE ENCOUNTER
Called and spoke with patient, gave results, have cath scheduled for Thursday 9/6/18 @ 0830. Gave instructions, verbally understood.

## 2018-08-29 RX ORDER — SODIUM CHLORIDE 9 MG/ML
INJECTION, SOLUTION INTRAVENOUS CONTINUOUS
Status: CANCELLED | OUTPATIENT
Start: 2018-09-06

## 2018-08-30 NOTE — TELEPHONE ENCOUNTER
Patient called to verify date and time of heart cath. I told patient this says 9/6/18 @ 0830 but patient wrote 7:00 down and would like the nurse to call her to verify time.

## 2018-08-30 NOTE — TELEPHONE ENCOUNTER
Called and spoke with patient, advised it was on Thursday 9/6/18 @ 0830 with 0700 arrival.  Patient verbally understood.

## 2018-09-06 ENCOUNTER — HOSPITAL ENCOUNTER (OUTPATIENT)
Dept: CARDIAC CATH/INVASIVE PROCEDURES | Age: 53
Discharge: HOME | End: 2018-09-06
Attending: INTERNAL MEDICINE | Admitting: INTERNAL MEDICINE
Payer: COMMERCIAL

## 2018-09-06 ENCOUNTER — APPOINTMENT (OUTPATIENT)
Dept: GENERAL RADIOLOGY | Age: 53
End: 2018-09-06
Attending: INTERNAL MEDICINE
Payer: COMMERCIAL

## 2018-09-06 VITALS
DIASTOLIC BLOOD PRESSURE: 45 MMHG | HEIGHT: 60 IN | SYSTOLIC BLOOD PRESSURE: 90 MMHG | OXYGEN SATURATION: 97 % | HEART RATE: 84 BPM | WEIGHT: 150 LBS | BODY MASS INDEX: 29.45 KG/M2 | RESPIRATION RATE: 21 BRPM | TEMPERATURE: 98.3 F

## 2018-09-06 PROBLEM — R94.39 ABNORMAL STRESS TEST: Status: ACTIVE | Noted: 2018-09-06

## 2018-09-06 LAB
ANION GAP SERPL CALCULATED.3IONS-SCNC: 12 MMOL/L (ref 7–19)
BUN BLDV-MCNC: 23 MG/DL (ref 6–20)
CALCIUM SERPL-MCNC: 9.9 MG/DL (ref 8.6–10)
CHLORIDE BLD-SCNC: 99 MMOL/L (ref 98–111)
CO2: 27 MMOL/L (ref 22–29)
CREAT SERPL-MCNC: 0.7 MG/DL (ref 0.5–0.9)
GFR NON-AFRICAN AMERICAN: >60
GLUCOSE BLD-MCNC: 143 MG/DL (ref 74–109)
HCT VFR BLD CALC: 41.2 % (ref 37–47)
HEMOGLOBIN: 13.4 G/DL (ref 12–16)
MCH RBC QN AUTO: 28.4 PG (ref 27–31)
MCHC RBC AUTO-ENTMCNC: 32.5 G/DL (ref 33–37)
MCV RBC AUTO: 87.3 FL (ref 81–99)
PDW BLD-RTO: 14.2 % (ref 11.5–14.5)
PLATELET # BLD: 277 K/UL (ref 130–400)
PMV BLD AUTO: 10.5 FL (ref 9.4–12.3)
POTASSIUM REFLEX MAGNESIUM: 5.5 MMOL/L (ref 3.5–5)
RBC # BLD: 4.72 M/UL (ref 4.2–5.4)
SODIUM BLD-SCNC: 138 MMOL/L (ref 136–145)
WBC # BLD: 12.7 K/UL (ref 4.8–10.8)

## 2018-09-06 PROCEDURE — 93458 L HRT ARTERY/VENTRICLE ANGIO: CPT | Performed by: INTERNAL MEDICINE

## 2018-09-06 PROCEDURE — 71046 X-RAY EXAM CHEST 2 VIEWS: CPT

## 2018-09-06 PROCEDURE — 99024 POSTOP FOLLOW-UP VISIT: CPT | Performed by: INTERNAL MEDICINE

## 2018-09-06 PROCEDURE — 85027 COMPLETE CBC AUTOMATED: CPT

## 2018-09-06 PROCEDURE — 2709999900 HC NON-CHARGEABLE SUPPLY

## 2018-09-06 PROCEDURE — 36415 COLL VENOUS BLD VENIPUNCTURE: CPT

## 2018-09-06 PROCEDURE — 80048 BASIC METABOLIC PNL TOTAL CA: CPT

## 2018-09-06 PROCEDURE — 2580000003 HC RX 258: Performed by: INTERNAL MEDICINE

## 2018-09-06 PROCEDURE — 2500000003 HC RX 250 WO HCPCS

## 2018-09-06 PROCEDURE — 6360000002 HC RX W HCPCS

## 2018-09-06 PROCEDURE — 99999 PR OFFICE/OUTPT VISIT,PROCEDURE ONLY: CPT | Performed by: INTERNAL MEDICINE

## 2018-09-06 PROCEDURE — C1760 CLOSURE DEV, VASC: HCPCS

## 2018-09-06 PROCEDURE — C1894 INTRO/SHEATH, NON-LASER: HCPCS

## 2018-09-06 RX ORDER — SODIUM CHLORIDE 0.9 % (FLUSH) 0.9 %
10 SYRINGE (ML) INJECTION PRN
Status: DISCONTINUED | OUTPATIENT
Start: 2018-09-06 | End: 2018-09-06 | Stop reason: HOSPADM

## 2018-09-06 RX ORDER — SODIUM CHLORIDE 0.9 % (FLUSH) 0.9 %
10 SYRINGE (ML) INJECTION EVERY 12 HOURS SCHEDULED
Status: DISCONTINUED | OUTPATIENT
Start: 2018-09-06 | End: 2018-09-06 | Stop reason: HOSPADM

## 2018-09-06 RX ORDER — SODIUM CHLORIDE 9 MG/ML
INJECTION, SOLUTION INTRAVENOUS CONTINUOUS
Status: DISCONTINUED | OUTPATIENT
Start: 2018-09-06 | End: 2018-09-06 | Stop reason: HOSPADM

## 2018-09-06 RX ORDER — ATORVASTATIN CALCIUM 40 MG/1
80 TABLET, FILM COATED ORAL DAILY
Qty: 30 TABLET | Refills: 3 | Status: SHIPPED | OUTPATIENT
Start: 2018-09-06

## 2018-09-06 RX ORDER — GARLIC 180 MG
40 TABLET, DELAYED RELEASE (ENTERIC COATED) ORAL DAILY
COMMUNITY

## 2018-09-06 RX ADMIN — SODIUM CHLORIDE: 9 INJECTION, SOLUTION INTRAVENOUS at 08:20

## 2018-09-06 NOTE — DISCHARGE SUMMARY
[facility]    Discharge Summary          I personally saw the patient and rounded with:  cath lab nurses RN, on  9/6/18      The observations documented in this note, including the assessment and plan are mine              Patient ID: Shara Eisenmenger May      Patient's PCP: Elizabeth Luciano MD    Admit Date: 9/6/2018     Discharge Date:  Matthew Oseguera MD     Admitting Physician:  Matthew Oseguera MD       Discharge Physician: Matthew Oseguera MD     Discharge Diagnoses:    1. Coronary artery disease    10/10/2017  DSE Positive for clinical myocardial ischemia, discordant risk findings, AUC indication 20, AUC score 7  10/23/17  Selective left heart and coronary arteriography, (radial approach), Mild CAD, normal LVFX   8/22/2018  lexiscan Positive for anterior myocardial ischemia, EF 83%, 15% ischemic myocardium on stress, high risk findings, AUC indication 17, AUC score 9  9/6/18  Cath 70% small diagonal, small vessels, anterior lateral hypokinesis, EF 45%    2. Hypertension  3. Hypercholesteremia  4. Cigarette use  5. Diabetes mellitus      Cardiac Specific Diagnoses:    Specialty Problems        Cardiology Problems    Essential hypertension        Mild CAD                The patient was seen and examined on day of discharge and this discharge summary is in conjunction with any daily progress note from day of discharge. History of Present Illness:     Unruly Starr is a 47 y.o. female who presents to [place] [address] with symptoms / signs / problem or diagnosis of need for cardiac catheterizaton.     She was seen in the office on 7/18/2018 by JULIO Beasley who had the following thoughts: \"Phuong is a 47 y. o. female who is here for Follow-up (Was having chest pressure with SOB Monday after work. Did go to the ED in [facility].) and Chest Pain     HPI   Patient returns to our office with an episode of chest pressure.  She went to the ER on Monday and was ruled out for MI.  She was seen in our

## 2018-09-06 NOTE — H&P
catheterization. When being examined this moring, she has had no symptoms of exertional chest discomfort, unusual or change in shortness of air, presyncope or syncope.        Family present:  Yes: two males and a female      CARDIAC RISK PROFILE:    Risk Factor Yes / No / Unknown       Gender Female   Cigarette Use Yes:    Family History of Cardiovascular Disease N/A   Diabetes Mellitus yes   Hypercholesteremia yes   Hypertension yes          Cardiac Specific Problems:    Specialty Problems        Cardiology Problems    Essential hypertension        Mild CAD                PRIOR CARDIAC PROBLEM LIST  (IF APPLICABLE):    34/50/1973  DSE Positive for clinical myocardial ischemia, discordant risk findings, AUC indication 20, AUC score 7  10/23/17  Selective left heart and coronary arteriography, (radial approach), Mild CAD, normal LVFX   8/22/2018  lexiscan Positive for anterior myocardial ischemia, EF 83%, 15% ischemic myocardium on stress, high risk findings, AUC indication 17, AUC score 9  9/6/18  Cath 70% small diagonal, small vessels, anterior lateral hypokinesis, EF 45%      Past Medical History:    Past Medical History:   Diagnosis Date    Anticoagulant long-term use     Cigarette nicotine dependence without complication 1/87/3670    Diabetes mellitus ([address]     pre diabetic    Essential hypertension 7/18/2018    GERD (gastroesophageal reflux disease)     Hyperlipidemia     Hypertension     Mild CAD 7/18/2018    Mixed hyperlipidemia 7/18/2018    Type 2 diabetes mellitus with complication, without long-term current use of insulin ([address] 7/18/2018         Past Surgical History:    Past Surgical History:   Procedure Laterality Date    OTHER SURGICAL HISTORY      Femoral Artery stent    NH COLONOSCOPY FLX DX W/COLLJ SPEC WHEN PFRMD N/A 11/17/2016    Dr Kb Ravi, 10 yr recall    NH EGD TRANSORAL BIOPSY SINGLE/MULTIPLE N/A 11/17/2016    Dr Letha Arambula-Gastric ulcers, moderate chronic active duodenitis, gastritis, esophagogastritis, gastropathy    VASCULAR SURGERY Left 2015: 2011    Stents to left leg         Home Medications:   Prior to Admission medications    Medication Sig Start Date End Date Taking? Authorizing Provider   Black Cohosh 40 MG CAPS Take 40 mg by mouth daily   Yes Historical Provider, MD   aspirin 81 MG tablet Take 81 mg by mouth daily   Yes Historical Provider, MD   atorvastatin (LIPITOR) 40 MG tablet Take 40 mg by mouth daily  9/24/17  Yes Historical Provider, MD   omeprazole (PRILOSEC) 40 MG delayed release capsule Take 1 capsule by mouth 2 times daily (before meals) Take first thing daily on an empty stomach. 11/17/16  Yes Edison Irwin MD   lisinopril-hydrochlorothiazide ROSALES [facility]) 20-12.5 MG per tablet Take 1 tablet by mouth daily  9/1/16  Yes Historical Provider, MD   metoprolol succinate (TOPROL XL) 25 MG extended release tablet Take 1 tablet by mouth daily 8/2/18   JULIO Newman   varenicline (CHANTIX STARTING MONTH PAK) 0.5 MG X 11 & 1 MG X 42 tablet Take by mouth. 8/2/18   JULIO Newman   varenicline (CHANTIX CONTINUING MONTH PAK) 1 MG tablet Take 1 tablet by mouth 2 times daily 8/2/18   JULIO Newman   metFORMIN (GLUCOPHAGE) 1000 MG tablet Take 1,000 mg by mouth 2 times daily (with meals)    Historical Provider, MD   cloNIDine (CATAPRES) 0.1 MG tablet Take 0.1 mg by mouth 2 times daily  9/1/16   Historical Provider, MD   clopidogrel (PLAVIX) 75 MG tablet Take 75 mg by mouth daily  8/26/16   Historical Provider, MD        Facility Administered Medications:    sodium chloride flush  10 mL Intravenous 2 times per day       Allergies:  Patient has no known allergies. Social History:       Social History     Social History    Marital status:      Spouse name: N/A    Number of children: N/A    Years of education: N/A     Occupational History    Not on file.      Social History Main Topics    Smoking status: Current Every Day Smoker

## 2019-06-26 ENCOUNTER — HOSPITAL ENCOUNTER (OUTPATIENT)
Dept: MRI IMAGING | Age: 54
Discharge: HOME | End: 2019-06-26
Payer: COMMERCIAL

## 2019-06-26 DIAGNOSIS — M54.16 RADICULOPATHY, LUMBAR REGION: ICD-10-CM

## 2019-06-26 DIAGNOSIS — M51.36 OTHER INTERVERTEBRAL DISC DEGENERATION, LUMBAR REGION: ICD-10-CM

## 2019-06-26 PROCEDURE — 72148 MRI LUMBAR SPINE W/O DYE: CPT

## 2024-02-13 PROBLEM — G89.29 CHRONIC LEFT-SIDED LOW BACK PAIN WITH LEFT-SIDED SCIATICA: Status: ACTIVE | Noted: 2024-02-13

## 2024-02-13 PROBLEM — M54.42 CHRONIC LEFT-SIDED LOW BACK PAIN WITH LEFT-SIDED SCIATICA: Status: ACTIVE | Noted: 2024-02-13

## 2024-02-26 DIAGNOSIS — E55.9 VITAMIN D DEFICIENCY: ICD-10-CM

## 2024-02-27 ENCOUNTER — OFFICE VISIT (OUTPATIENT)
Dept: INTERNAL MEDICINE | Facility: CLINIC | Age: 59
End: 2024-02-27
Payer: COMMERCIAL

## 2024-02-27 VITALS
OXYGEN SATURATION: 98 % | BODY MASS INDEX: 29.84 KG/M2 | SYSTOLIC BLOOD PRESSURE: 156 MMHG | HEART RATE: 86 BPM | WEIGHT: 152 LBS | TEMPERATURE: 97.8 F | HEIGHT: 60 IN | DIASTOLIC BLOOD PRESSURE: 78 MMHG

## 2024-02-27 DIAGNOSIS — Z87.891 PERSONAL HISTORY OF TOBACCO USE, PRESENTING HAZARDS TO HEALTH: ICD-10-CM

## 2024-02-27 DIAGNOSIS — E78.5 DYSLIPIDEMIA: ICD-10-CM

## 2024-02-27 DIAGNOSIS — E55.9 VITAMIN D DEFICIENCY: ICD-10-CM

## 2024-02-27 DIAGNOSIS — E11.9 TYPE 2 DIABETES MELLITUS WITHOUT COMPLICATION, WITHOUT LONG-TERM CURRENT USE OF INSULIN: ICD-10-CM

## 2024-02-27 DIAGNOSIS — Z12.31 SCREENING MAMMOGRAM FOR BREAST CANCER: Primary | ICD-10-CM

## 2024-02-27 DIAGNOSIS — Z01.419 ROUTINE GYNECOLOGICAL EXAMINATION: ICD-10-CM

## 2024-02-27 DIAGNOSIS — I10 PRIMARY HYPERTENSION: ICD-10-CM

## 2024-02-27 DIAGNOSIS — K21.9 GASTROESOPHAGEAL REFLUX DISEASE, UNSPECIFIED WHETHER ESOPHAGITIS PRESENT: ICD-10-CM

## 2024-02-27 DIAGNOSIS — Z00.00 ANNUAL PHYSICAL EXAM: Primary | ICD-10-CM

## 2024-02-27 DIAGNOSIS — Z12.2 SCREENING FOR MALIGNANT NEOPLASM OF RESPIRATORY ORGAN: ICD-10-CM

## 2024-02-27 DIAGNOSIS — R07.89 OTHER CHEST PAIN: ICD-10-CM

## 2024-02-27 RX ORDER — CLOPIDOGREL BISULFATE 75 MG/1
75 TABLET ORAL DAILY
Qty: 90 TABLET | Refills: 3 | Status: SHIPPED | OUTPATIENT
Start: 2024-02-27

## 2024-02-27 RX ORDER — ERGOCALCIFEROL 1.25 MG/1
50000 CAPSULE ORAL WEEKLY
Qty: 5 CAPSULE | Refills: 3 | Status: CANCELLED | OUTPATIENT
Start: 2024-02-27

## 2024-02-27 RX ORDER — OMEPRAZOLE 40 MG/1
40 CAPSULE, DELAYED RELEASE ORAL DAILY
Qty: 90 CAPSULE | Refills: 3 | Status: CANCELLED | OUTPATIENT
Start: 2024-02-27

## 2024-02-27 RX ORDER — FENOFIBRATE 145 MG/1
145 TABLET, COATED ORAL DAILY
Qty: 90 TABLET | Refills: 3 | Status: CANCELLED | OUTPATIENT
Start: 2024-02-27

## 2024-02-27 RX ORDER — ERGOCALCIFEROL 1.25 MG/1
50000 CAPSULE ORAL WEEKLY
Qty: 12 CAPSULE | Refills: 3 | Status: SHIPPED | OUTPATIENT
Start: 2024-02-27

## 2024-02-27 RX ORDER — LISINOPRIL AND HYDROCHLOROTHIAZIDE 20; 12.5 MG/1; MG/1
1 TABLET ORAL 2 TIMES DAILY
Qty: 180 TABLET | Refills: 3 | Status: SHIPPED | OUTPATIENT
Start: 2024-02-27

## 2024-02-27 RX ORDER — GLIPIZIDE 10 MG/1
10 TABLET ORAL
Qty: 180 TABLET | Refills: 3 | Status: SHIPPED | OUTPATIENT
Start: 2024-02-27

## 2024-02-27 RX ORDER — OMEPRAZOLE 40 MG/1
40 CAPSULE, DELAYED RELEASE ORAL DAILY
Qty: 90 CAPSULE | Refills: 3 | Status: SHIPPED | OUTPATIENT
Start: 2024-02-27

## 2024-02-27 RX ORDER — CLOPIDOGREL BISULFATE 75 MG/1
75 TABLET ORAL DAILY
Qty: 90 TABLET | Refills: 3 | Status: CANCELLED | OUTPATIENT
Start: 2024-02-27

## 2024-02-27 RX ORDER — FENOFIBRATE 145 MG/1
145 TABLET, COATED ORAL DAILY
Qty: 90 TABLET | Refills: 3 | Status: SHIPPED | OUTPATIENT
Start: 2024-02-27

## 2024-02-27 RX ORDER — LISINOPRIL AND HYDROCHLOROTHIAZIDE 20; 12.5 MG/1; MG/1
1 TABLET ORAL 2 TIMES DAILY
Qty: 180 TABLET | Refills: 3 | Status: CANCELLED | OUTPATIENT
Start: 2024-02-27

## 2024-02-27 RX ORDER — GLIPIZIDE 10 MG/1
5 TABLET ORAL
Qty: 90 TABLET | Refills: 3 | Status: CANCELLED | OUTPATIENT
Start: 2024-02-27

## 2024-02-27 RX ORDER — ERGOCALCIFEROL 1.25 MG/1
50000 CAPSULE ORAL WEEKLY
Qty: 5 CAPSULE | Refills: 3 | Status: SHIPPED | OUTPATIENT
Start: 2024-02-27

## 2024-02-27 NOTE — PROGRESS NOTES
Subjective     Chief Complaint   Patient presents with    Annual Exam       History of Present Illness    Catrachita Tse is a 58 y.o. female who presents for a annual physical at this time.  Patient states that she has been doing well overall.  Patient would like to go ahead and do a lot of her annual screening at this time.  Will check his CBC and TSH as well as her diabetic labs and lipid profile.  Patient states that overall her diabetes is not as well-controlled as she would like but she would like to see the labs before doing anything further.  Patient would like to go ahead and get set up with OB/GYN for Pap smear and mammography.    Catrachita Tse  reports that she has been smoking cigarettes. She has a 30.00 pack-year smoking history. She has never used smokeless tobacco.. I have educated her on the risk of diseases from using tobacco products such as cancer, COPD, and heart disease.     I advised her to quit and she is not willing to quit.    I spent 5 minutes counseling the patient.  Patient would like to get set up for CT lung cancer screening at this time however.    Discussed at length with this patient the benefit of taking vaccinations and their role in preventing, and/or decreasing severity of disease.  This patient has chosen not to get any vaccinations at this time.     Diabetic foot exam:   Left: Filament test present   Pulses Dorsalis Pedis:  present  Posterior Tibial:  present   Reflexes 2+    Vibratory sensation normal   Proprioception normal   Sharp/dull discrimination normal       Right: Filament test present   Pulses Dorsalis Pedis:  present  Posterior Tibial:  present   Reflexes 2+    Vibratory sensation normal   Proprioception normal   Sharp/dull discrimination normal     I discussed at preventative health care and cancer screening with her and its role in reducing types of cancer and other health problems appropriate for the patient's age.  Also discussed vaccines and current status  with the patient.  Patient understands the risks and benefits of screening and is currently up-to-date with current recommendations that she chosses.    Patient also needed refills on several medications today.  Patient reports that her GERD vitamin D deficiency hypertension and dyslipidemia have remained stable on her current meds blood pressure was moderately elevated today but has been in better shape in the past we will continue to follow this and if it remains elevated we will add additional medication.    Patient also complains today some occasional chest pain.  Patient states that she has a stinging and burning that is appeared on the left side of her chest has had a heart cath a couple years ago that was normal reviewed and new EKG EKG today which did have changes we will go ahead and get her set up with cardiology for further evaluation    Patient's PMR from outside medical facility reviewed and noted.      Past Medical History:   Past Medical History:   Diagnosis Date    Anxiety     Depression     Diabetes mellitus     GA (granuloma annulare)     GERD (gastroesophageal reflux disease)     Hypertension     PAD (peripheral artery disease)      Past Surgical History:  Past Surgical History:   Procedure Laterality Date    AORTAGRAM Left 2023    Procedure: LEFT LOWER EXTREMITY ANGIOGRAM, BALLOON ANGIOPLASTY MYNX CLOSURE;  Surgeon: Kei Jones DO;  Location: [facility] HYBRID OR 12;  Service: Vascular;  Laterality: Left;    CARDIAC CATHETERIZATION       SECTION      COLONOSCOPY N/A 2023    Procedure: COLONOSCOPY WITH ANESTHESIA;  Surgeon: Mushtaq Crews MD;  Location: [facility] ENDOSCOPY;  Service: Gastroenterology;  Laterality: N/A;  preop; diarrhea  postop; polyps; diverticulosis   PCP Ana Hernandez     ILIAC ARTERY STENT Bilateral     IR ANGIOGRAM EXTREMITY UNILATERAL       Social History:  reports that she has been smoking cigarettes. She has a 30.00 pack-year smoking history. She has  never used smokeless tobacco. She reports that she does not drink alcohol and does not use drugs.    Family History: family history includes No Known Problems in her father and mother. She was adopted.      Allergies:  No Known Allergies  Medications:  Prior to Admission medications    Medication Sig Start Date End Date Taking? Authorizing Provider   aspirin 81 MG EC tablet Take 1 tablet by mouth Daily.   Yes ProviderSanjuana MD   atorvastatin (LIPITOR) 40 MG tablet Take 1 tablet by mouth Daily. 7/17/23  Yes Ambrocio Gutierrez APRN   clopidogrel (PLAVIX) 75 MG tablet Take 1 tablet by mouth Daily. 3/28/23  Yes Ana Hernandez DO   fenofibrate (TRICOR) 145 MG tablet Take 1 tablet by mouth Daily. 3/28/23  Yes Ana Hernandez DO   fluticasone (FLONASE) 50 MCG/ACT nasal spray 1 spray into the nostril(s) as directed by provider Daily. 12/26/23  Yes William Ellis MD   glipizide (Glucotrol) 10 MG tablet Take 0.5 tablets by mouth 2 (Two) Times a Day Before Meals.  Patient taking differently: Take 1 tablet by mouth 2 (Two) Times a Day Before Meals. 7/17/23  Yes Ambrocio Gutierrez APRN   hydrOXYzine pamoate (Vistaril) 25 MG capsule Take 1 capsule by mouth 3 (Three) Times a Day As Needed for Anxiety. 9/20/23  Yes William Ellis MD   lisinopril-hydrochlorothiazide (PRINZIDE,ZESTORETIC) 20-12.5 MG per tablet Take 1 tablet by mouth 2 (Two) Times a Day. 3/28/23  Yes Ana Hernandez DO   metFORMIN (GLUCOPHAGE) 1000 MG tablet Take 1 tablet by mouth 2 (Two) Times a Day With Meals. 3/28/23  Yes Ana Hernandez DO   omeprazole (priLOSEC) 40 MG capsule Take 1 capsule by mouth Daily. 3/28/23  Yes Ana Hernandez DO   vitamin D (ERGOCALCIFEROL) 1.25 MG (87504 UT) capsule capsule Take 1 capsule by mouth 1 (One) Time Per Week. 2/27/24  Yes William Ellis MD   albuterol sulfate  (90 Base) MCG/ACT inhaler Inhale 2 puffs Every 6 (Six) Hours As Needed for Wheezing. 12/8/22 2/27/24  Yes Ana Hernandez, DO       CONY: Over the last two weeks, how often have you been bothered by the following problems?  Feeling nervous, anxious or on edge: Not at all  Not being able to stop or control worrying: Not at all  Worrying too much about different things: Not at all  Trouble Relaxing: Not at all  Being so restless that it is hard to sit still: Not at all  Becoming easily annoyed or irritable: Not at all  Feeling afraid as if something awful might happen: Not at all  CONY 7 Total Score: 0  If you checked any problems, how difficult have these problems made it for you to do your work, take care of things at home, or get along with other people: Not difficult at all      PHQ-9 Depression Screening  Little interest or pleasure in doing things? 1-->several days   Feeling down, depressed, or hopeless? 1-->several days   Trouble falling or staying asleep, or sleeping too much? 0-->not at all   Feeling tired or having little energy? 3-->nearly every day   Poor appetite or overeating? 0-->not at all   Feeling bad about yourself - or that you are a failure or have let yourself or your family down? 0-->not at all   Trouble concentrating on things, such as reading the newspaper or watching television? 0-->not at all   Moving or speaking so slowly that other people could have noticed? Or the opposite - being so fidgety or restless that you have been moving around a lot more than usual? 0-->not at all   Thoughts that you would be better off dead, or of hurting yourself in some way? 0-->not at all   PHQ-9 Total Score 5   If you checked off any problems, how difficult have these problems made it for you to do your work, take care of things at home, or get along with other people? somewhat difficult       PHQ-9 Total Score: 5   5-9 (Mild Depression)      Review of systems   negative unless otherwise specified above in HPI    Objective     Vital Signs: /78 (BP Location: Left arm, Patient Position: Sitting, Cuff  "Size: Adult)   Pulse 86   Temp 97.8 °F (36.6 °C) (Infrared)   Ht 152.4 cm (60\")   Wt 68.9 kg (152 lb)   SpO2 98%   BMI 29.69 kg/m²     Physical Exam  Vitals and nursing note reviewed.   Constitutional:       General: She is not in acute distress.     Appearance: Normal appearance.   HENT:      Head: Normocephalic.   Eyes:      Extraocular Movements: Extraocular movements intact.      Pupils: Pupils are equal, round, and reactive to light.   Cardiovascular:      Rate and Rhythm: Normal rate and regular rhythm.      Heart sounds: Normal heart sounds. No murmur heard.  Pulmonary:      Effort: Pulmonary effort is normal. No respiratory distress.      Breath sounds: Normal breath sounds. No rhonchi or rales.   Abdominal:      General: Abdomen is flat. Bowel sounds are normal.      Palpations: Abdomen is soft.   Neurological:      General: No focal deficit present.      Mental Status: She is alert.                Results Reviewed:  Glucose   Date Value Ref Range Status   10/31/2023 238 (H) 70 - 99 mg/dL Final   09/14/2023 284 (H) 65 - 99 mg/dL Final     BUN   Date Value Ref Range Status   10/31/2023 17 6 - 24 mg/dL Final   09/14/2023 19 6 - 20 mg/dL Final     Creatinine   Date Value Ref Range Status   10/31/2023 0.82 0.57 - 1.00 mg/dL Final   09/14/2023 0.73 0.57 - 1.00 mg/dL Final     Sodium   Date Value Ref Range Status   10/31/2023 135 134 - 144 mmol/L Final   09/14/2023 137 136 - 145 mmol/L Final     Potassium   Date Value Ref Range Status   10/31/2023 4.6 3.5 - 5.2 mmol/L Final   09/14/2023 4.0 3.5 - 5.2 mmol/L Final     Chloride   Date Value Ref Range Status   10/31/2023 97 96 - 106 mmol/L Final   09/14/2023 100 98 - 107 mmol/L Final     CO2   Date Value Ref Range Status   09/14/2023 23.0 22.0 - 29.0 mmol/L Final     Total CO2   Date Value Ref Range Status   10/31/2023 23 20 - 29 mmol/L Final     Calcium   Date Value Ref Range Status   10/31/2023 9.9 8.7 - 10.2 mg/dL Final   09/14/2023 9.6 8.6 - 10.5 mg/dL " Final     ALT (SGPT)   Date Value Ref Range Status   10/31/2023 29 0 - 32 IU/L Final     AST (SGOT)   Date Value Ref Range Status   10/31/2023 16 0 - 40 IU/L Final     WBC   Date Value Ref Range Status   09/14/2023 10.34 3.40 - 10.80 10*3/mm3 Final   03/28/2023 11.5 (H) 3.4 - 10.8 x10E3/uL Final     Hematocrit   Date Value Ref Range Status   09/14/2023 39.0 34.0 - 46.6 % Final     Platelets   Date Value Ref Range Status   09/14/2023 399 140 - 450 10*3/mm3 Final     Triglycerides   Date Value Ref Range Status   10/24/2023 457 (H) 0 - 149 mg/dL Final     HDL Cholesterol   Date Value Ref Range Status   10/24/2023 27 (L) >39 mg/dL Final     LDL Chol Calc (NIH)   Date Value Ref Range Status   10/24/2023 95 0 - 99 mg/dL Final     Hemoglobin A1C   Date Value Ref Range Status   10/24/2023 9.5 (H) 4.8 - 5.6 % Final     Comment:              Prediabetes: 5.7 - 6.4           Diabetes: >6.4           Glycemic control for adults with diabetes: <7.0     07/19/2023 7.8 % Final             Procedure     ECG 12 Lead    Date/Time: 2/27/2024 8:12 AM  Performed by: William Ellis MD    Authorized by: William Ellis MD  Comparison: compared with previous ECG   Comparison to previous ECG: Septal myocardial infaction of indeterminate age  Rhythm: sinus rhythm    Clinical impression: abnormal EKG           Assessment / Plan     Assessment/Plan:   Diagnosis Plan   1. Annual physical exam  CBC & Differential    TSH      2. Type 2 diabetes mellitus without complication, without long-term current use of insulin  glipizide (Glucotrol) 10 MG tablet    metFORMIN (GLUCOPHAGE) 1000 MG tablet    Microalbumin / Creatinine Urine Ratio - Urine, Clean Catch    Lipid Panel    Comprehensive Metabolic Panel    Hemoglobin A1c    TSH      3. Dyslipidemia  clopidogrel (PLAVIX) 75 MG tablet    fenofibrate (TRICOR) 145 MG tablet      4. Primary hypertension  lisinopril-hydrochlorothiazide (PRINZIDE,ZESTORETIC) 20-12.5 MG per tablet      5.  Vitamin D deficiency  vitamin D (ERGOCALCIFEROL) 1.25 MG (09084 UT) capsule capsule      6. Gastroesophageal reflux disease, unspecified whether esophagitis present  omeprazole (priLOSEC) 40 MG capsule      7. Other chest pain  ECG 12 Lead    Ambulatory Referral to Cardiology      8. Screening for malignant neoplasm of respiratory organ   CT Chest Low Dose Cancer Screening WO      9. Personal history of tobacco use, presenting hazards to health   CT Chest Low Dose Cancer Screening WO      10. Routine gynecological examination  Ambulatory Referral to Obstetrics / Gynecology            Return in about 3 months (around 5/27/2024). unless patient needs to be seen sooner or acute issues arise.      I have discussed the patient results/orders and and plan/recommendation with them at today's visit.      Signed by:    William Ellis MD Date: 02/27/24

## 2024-02-28 DIAGNOSIS — E11.65 TYPE 2 DIABETES MELLITUS WITH HYPERGLYCEMIA, WITHOUT LONG-TERM CURRENT USE OF INSULIN: Primary | ICD-10-CM

## 2024-02-28 LAB
ALBUMIN SERPL-MCNC: 4.4 G/DL (ref 3.8–4.9)
ALBUMIN/CREAT UR: <10 MG/G CREAT (ref 0–29)
ALBUMIN/GLOB SERPL: 1.6 {RATIO} (ref 1.2–2.2)
ALP SERPL-CCNC: 46 IU/L (ref 44–121)
ALT SERPL-CCNC: 31 IU/L (ref 0–32)
AST SERPL-CCNC: 20 IU/L (ref 0–40)
BASOPHILS # BLD AUTO: 0 X10E3/UL (ref 0–0.2)
BASOPHILS NFR BLD AUTO: 0 %
BILIRUB SERPL-MCNC: <0.2 MG/DL (ref 0–1.2)
BUN SERPL-MCNC: 24 MG/DL (ref 6–24)
BUN/CREAT SERPL: 28 (ref 9–23)
CALCIUM SERPL-MCNC: 10.5 MG/DL (ref 8.7–10.2)
CHLORIDE SERPL-SCNC: 96 MMOL/L (ref 96–106)
CHOLEST SERPL-MCNC: 149 MG/DL (ref 100–199)
CO2 SERPL-SCNC: 18 MMOL/L (ref 20–29)
CREAT SERPL-MCNC: 0.87 MG/DL (ref 0.57–1)
CREAT UR-MCNC: 28.7 MG/DL
EGFRCR SERPLBLD CKD-EPI 2021: 78 ML/MIN/1.73
EOSINOPHIL # BLD AUTO: 0.3 X10E3/UL (ref 0–0.4)
EOSINOPHIL NFR BLD AUTO: 3 %
ERYTHROCYTE [DISTWIDTH] IN BLOOD BY AUTOMATED COUNT: 14 % (ref 11.7–15.4)
GLOBULIN SER CALC-MCNC: 2.8 G/DL (ref 1.5–4.5)
GLUCOSE SERPL-MCNC: 229 MG/DL (ref 70–99)
HBA1C MFR BLD: 11 % (ref 4.8–5.6)
HCT VFR BLD AUTO: 39.3 % (ref 34–46.6)
HDLC SERPL-MCNC: 31 MG/DL
HGB BLD-MCNC: 13.1 G/DL (ref 11.1–15.9)
IMM GRANULOCYTES # BLD AUTO: 0.1 X10E3/UL (ref 0–0.1)
IMM GRANULOCYTES NFR BLD AUTO: 1 %
LDLC SERPL CALC-MCNC: 74 MG/DL (ref 0–99)
LYMPHOCYTES # BLD AUTO: 2.5 X10E3/UL (ref 0.7–3.1)
LYMPHOCYTES NFR BLD AUTO: 28 %
Lab: NORMAL
MCH RBC QN AUTO: 26.7 PG (ref 26.6–33)
MCHC RBC AUTO-ENTMCNC: 33.3 G/DL (ref 31.5–35.7)
MCV RBC AUTO: 80 FL (ref 79–97)
MICROALBUMIN UR-MCNC: <3 UG/ML
MONOCYTES # BLD AUTO: 0.4 X10E3/UL (ref 0.1–0.9)
MONOCYTES NFR BLD AUTO: 5 %
NEUTROPHILS # BLD AUTO: 5.5 X10E3/UL (ref 1.4–7)
NEUTROPHILS NFR BLD AUTO: 63 %
PLATELET # BLD AUTO: 382 X10E3/UL (ref 150–450)
POTASSIUM SERPL-SCNC: 5 MMOL/L (ref 3.5–5.2)
PROT SERPL-MCNC: 7.2 G/DL (ref 6–8.5)
RBC # BLD AUTO: 4.9 X10E6/UL (ref 3.77–5.28)
SODIUM SERPL-SCNC: 133 MMOL/L (ref 134–144)
TRIGL SERPL-MCNC: 270 MG/DL (ref 0–149)
TSH SERPL DL<=0.005 MIU/L-ACNC: 3.42 UIU/ML (ref 0.45–4.5)
VLDLC SERPL CALC-MCNC: 44 MG/DL (ref 5–40)
WBC # BLD AUTO: 8.8 X10E3/UL (ref 3.4–10.8)

## 2024-03-04 LAB
NCCN CRITERIA FLAG: NORMAL
TYRER CUZICK SCORE: 6.7

## 2024-03-12 ENCOUNTER — HOSPITAL ENCOUNTER (OUTPATIENT)
Dept: MRI IMAGING | Facility: HOSPITAL | Age: 59
Discharge: HOME | End: 2024-03-12
Admitting: NURSE PRACTITIONER
Payer: COMMERCIAL

## 2024-03-12 DIAGNOSIS — G89.29 CHRONIC LEFT-SIDED LOW BACK PAIN WITH LEFT-SIDED SCIATICA: ICD-10-CM

## 2024-03-12 DIAGNOSIS — M54.42 CHRONIC LEFT-SIDED LOW BACK PAIN WITH LEFT-SIDED SCIATICA: ICD-10-CM

## 2024-03-12 PROCEDURE — 72148 MRI LUMBAR SPINE W/O DYE: CPT

## 2024-03-13 ENCOUNTER — TELEPHONE (OUTPATIENT)
Dept: NEUROSURGERY | Facility: CLINIC | Age: 59
End: 2024-03-13
Payer: COMMERCIAL

## 2024-03-13 ENCOUNTER — OFFICE VISIT (OUTPATIENT)
Dept: CARDIOLOGY | Facility: CLINIC | Age: 59
End: 2024-03-13
Payer: COMMERCIAL

## 2024-03-13 VITALS
HEART RATE: 113 BPM | SYSTOLIC BLOOD PRESSURE: 120 MMHG | OXYGEN SATURATION: 99 % | WEIGHT: 150 LBS | HEIGHT: 60 IN | BODY MASS INDEX: 29.45 KG/M2 | DIASTOLIC BLOOD PRESSURE: 62 MMHG

## 2024-03-13 DIAGNOSIS — G89.29 CHRONIC LEFT-SIDED LOW BACK PAIN WITH LEFT-SIDED SCIATICA: ICD-10-CM

## 2024-03-13 DIAGNOSIS — M54.42 CHRONIC LEFT-SIDED LOW BACK PAIN WITH LEFT-SIDED SCIATICA: ICD-10-CM

## 2024-03-13 DIAGNOSIS — F17.210 CIGARETTE NICOTINE DEPENDENCE WITHOUT COMPLICATION: ICD-10-CM

## 2024-03-13 DIAGNOSIS — E78.2 MIXED HYPERLIPIDEMIA: ICD-10-CM

## 2024-03-13 DIAGNOSIS — N28.1 RENAL CYST: Primary | ICD-10-CM

## 2024-03-13 DIAGNOSIS — I25.10 MILD CAD: ICD-10-CM

## 2024-03-13 DIAGNOSIS — R06.09 DYSPNEA ON EXERTION: ICD-10-CM

## 2024-03-13 DIAGNOSIS — I20.0 UNSTABLE ANGINA: Primary | ICD-10-CM

## 2024-03-13 DIAGNOSIS — E11.8 TYPE 2 DIABETES MELLITUS WITH COMPLICATION, WITHOUT LONG-TERM CURRENT USE OF INSULIN: ICD-10-CM

## 2024-03-13 DIAGNOSIS — I73.9 PAD (PERIPHERAL ARTERY DISEASE): ICD-10-CM

## 2024-03-13 DIAGNOSIS — I10 PRIMARY HYPERTENSION: ICD-10-CM

## 2024-03-13 RX ORDER — METOPROLOL SUCCINATE 25 MG/1
25 TABLET, EXTENDED RELEASE ORAL DAILY
Qty: 90 TABLET | Refills: 3 | Status: SHIPPED | OUTPATIENT
Start: 2024-03-13

## 2024-03-13 RX ORDER — AMLODIPINE BESYLATE 10 MG/1
10 TABLET ORAL DAILY
Qty: 90 TABLET | Refills: 3 | Status: SHIPPED | OUTPATIENT
Start: 2024-03-13

## 2024-03-13 NOTE — PROGRESS NOTES
[facility] - CARDIOLOGY  New Patient Initial Outpatient Evaulation    Primary Care Physician: William Ellis MD    Subjective     Chief Complaint   Patient presents with    Chest Pain     NEW PT        History of Present Illness    Catrachita Argueta is a 58-year-old female who presents for an initial visit.    She underwent a positive stress test in 2017 which led to a heart catheterization in 2017 with Dr. Ana Hernandez at [facility] in [place], [place] and was diagnosed with coronary artery disease. She underwent another stress test in 2018 which showed anterior ischemia and a heart catheterization was suggested. She underwent another heart catheterization in 09/2018 which showed 70 percent blockage in a small diagonal branch, which was not fixed. She confirms never having any stents. She underwent a stress test in 2022 which was inconclusive.     She had not had any troubles until about 3 or 4 weeks ago, when she was coming home from [facility], and she experienced 5 or 6 sharp chest pains. It did happen again but was not as bad. She did not go to the emergency room. She mentioned it to her primary provider a few weeks ago during her annual checkup. Her doctor did an EKG in the office and compared it to the one she had previously. He did feel she had a small heart attack or cardiac episode of some kind. He referred her here to cardiology.     She does not remember what her symptoms were before her heart catheterization. According to her previous office notes, she was experiencing heavy pressure in her chest and shortness of breath. She reports that she has ongoing shortness of breath but lately it is worse. Other than the chest pains she experienced a few weeks ago, she denies any current chest pain, although she does not know what a heart attack feels like. She is not sure if she has sweating episodes with chest pain because she has hot flashes. She denies nausea.    She has comorbidities of hypertension,  hyperlipidemia, and diabetes mellitus.     Her blood pressure today is controlled today at 120/62 mmHg. Her heart rate is 113 beats per minute.     She cannot do the treadmill test because of her leg.    The patient is a smoker. She is .     The patient is currently taking atorvastatin (Lipitor) and fenofibrate (Tricor) for hyperlipidemia. She takes aspirin, Plavix, as well as lisinopril-hydrochlorothiazide for hypertension.       Review of Systems   Constitutional: Negative for diaphoresis, fever and malaise/fatigue.   HENT:  Negative for congestion.    Eyes:  Negative for vision loss in left eye and vision loss in right eye.   Cardiovascular:  Positive for chest pain. Negative for claudication, dyspnea on exertion, irregular heartbeat, leg swelling, orthopnea, palpitations and syncope.   Respiratory:  Positive for shortness of breath. Negative for cough and wheezing.    Hematologic/Lymphatic: Negative for adenopathy.   Skin:  Negative for rash.   Musculoskeletal:  Negative for joint pain and joint swelling.   Gastrointestinal:  Negative for abdominal pain, diarrhea, nausea and vomiting.   Neurological:  Negative for excessive daytime sleepiness, dizziness, focal weakness, light-headedness, numbness and weakness.   Psychiatric/Behavioral:  Negative for depression. The patient does not have insomnia.         Otherwise complete ROS reviewed and negative except as mentioned in the HPI.      Past Medical History:   Past Medical History:   Diagnosis Date    Anxiety     Depression     Diabetes mellitus     GA (granuloma annulare)     GERD (gastroesophageal reflux disease)     Hypertension     PAD (peripheral artery disease)        Past Surgical History:  Past Surgical History:   Procedure Laterality Date    AORTAGRAM Left 09/22/2023    Procedure: LEFT LOWER EXTREMITY ANGIOGRAM, BALLOON ANGIOPLASTY MYNX CLOSURE;  Surgeon: Kei Jones DO;  Location: [facility] OR ;  Service: Vascular;  Laterality: Left;     CARDIAC CATHETERIZATION       SECTION      COLONOSCOPY N/A 2023    Procedure: COLONOSCOPY WITH ANESTHESIA;  Surgeon: Mushtaq Crews MD;  Location: [facility] ENDOSCOPY;  Service: Gastroenterology;  Laterality: N/A;  preop; diarrhea  postop; polyps; diverticulosis   PCP Ana Hernandez     ILIAC ARTERY STENT Bilateral     IR ANGIOGRAM EXTREMITY UNILATERAL         Family History: family history includes No Known Problems in her father and mother. She was adopted.    Social History:  reports that she has been smoking cigarettes. She has a 30 pack-year smoking history. She has never used smokeless tobacco. She reports that she does not drink alcohol and does not use drugs.    Medications:  Prior to Admission medications    Medication Sig Start Date End Date Taking? Authorizing Provider   aspirin 81 MG EC tablet Take 1 tablet by mouth Daily.   Yes Provider, MD Sanjuana   atorvastatin (LIPITOR) 40 MG tablet Take 1 tablet by mouth Daily. 23  Yes Ambrocio Gutierrez APRN   clopidogrel (PLAVIX) 75 MG tablet Take 1 tablet by mouth Daily. 24  Yes William Ellis MD   fenofibrate (TRICOR) 145 MG tablet Take 1 tablet by mouth Daily. 24  Yes William Ellis MD   fluticasone (FLONASE) 50 MCG/ACT nasal spray 1 spray into the nostril(s) as directed by provider Daily. 23  Yes William Ellis MD   glipizide (Glucotrol) 10 MG tablet Take 1 tablet by mouth 2 (Two) Times a Day Before Meals. 24  Yes William Ellis MD   lisinopril-hydrochlorothiazide (PRINZIDE,ZESTORETIC) 20-12.5 MG per tablet Take 1 tablet by mouth 2 (Two) Times a Day. 24  Yes William Ellis MD   omeprazole (priLOSEC) 40 MG capsule Take 1 capsule by mouth Daily. 24  Yes William Ellis MD   vitamin D (ERGOCALCIFEROL) 1.25 MG (04905 UT) capsule capsule Take 1 capsule by mouth 1 (One) Time Per Week. 24  Yes William Ellis MD   vitamin D  "(ERGOCALCIFEROL) 1.25 MG (06005 UT) capsule capsule Take 1 capsule by mouth 1 (One) Time Per Week. 2/27/24  Yes William Ellis MD   amLODIPine (NORVASC) 10 MG tablet Take 1 tablet by mouth Daily. 3/13/24   Duncan Norton,    metoprolol succinate XL (TOPROL-XL) 25 MG 24 hr tablet Take 1 tablet by mouth Daily. 3/13/24   Duncan Norton,    empagliflozin (Jardiance) 10 MG tablet tablet Take 1 tablet by mouth Daily. 2/28/24 3/13/24  William Ellis MD   hydrOXYzine pamoate (Vistaril) 25 MG capsule Take 1 capsule by mouth 3 (Three) Times a Day As Needed for Anxiety. 9/20/23 3/13/24  William Ellis MD   metFORMIN (GLUCOPHAGE) 1000 MG tablet Take 1 tablet by mouth 2 (Two) Times a Day With Meals. 2/27/24 3/13/24  William Ellis MD     Allergies:  No Known Allergies    Objective     Vital Signs: /62   Pulse 113   Ht 152.4 cm (60\")   Wt 68 kg (150 lb)   SpO2 99%   BMI 29.29 kg/m²     Vitals and nursing note reviewed.   Constitutional:       Appearance: Normal and healthy appearance. Well-developed and not in distress.   Eyes:      Extraocular Movements: Extraocular movements intact.      Pupils: Pupils are equal, round, and reactive to light.   HENT:      Head: Normocephalic and atraumatic.    Mouth/Throat:      Pharynx: Oropharynx is clear.   Neck:      Vascular: JVD normal.      Trachea: Trachea normal.   Pulmonary:      Effort: Pulmonary effort is normal.      Breath sounds: Normal breath sounds. No wheezing. No rhonchi. No rales.   Cardiovascular:      PMI at left midclavicular line. Normal rate. Regular rhythm. Normal S1. Normal S2.       Murmurs: There is a grade 2/6 systolic murmur.      No gallop.  No click. No rub.   Pulses:     Dorsalis pedis: 2+ bilaterally.     Posterior tibial: 2+ bilaterally.  Abdominal:      General: Bowel sounds are normal.      Palpations: Abdomen is soft.      Tenderness: There is no abdominal tenderness. "   Musculoskeletal: Normal range of motion.      Cervical back: Normal range of motion and neck supple. Skin:     General: Skin is warm and dry.      Capillary Refill: Capillary refill takes less than 2 seconds.   Feet:      Right foot:      Skin integrity: Skin integrity normal.      Left foot:      Skin integrity: Skin integrity normal.   Neurological:      Mental Status: Alert and oriented to person, place and time.      Sensory: Sensation is intact.      Motor: Motor function is intact.      Coordination: Coordination is intact.   Psychiatric:         Speech: Speech normal.         Behavior: Behavior is cooperative.         Results Reviewed:    Lab work completed in 02/2024 was reviewed with the patient. Hemoglobin A1c was 11.0 percent. Lipid panel showed LDL cholesterol slightly elevated.     Stress test performed in 2018 was reviewed. Anterior ischemia, ejection fraction of 83 percent. Dr. Mercer suggested cardiac catheterization.      Stress test performed in 09/2022 was reviewed. Impressions are consistent with a study indicating uncertain risk according to Dr. Saldana.           Procedures      Lab Results   Component Value Date    TRIG 270 (H) 02/26/2024    HDL 31 (L) 02/26/2024    VLDL 44 (H) 02/26/2024     Lab Results   Component Value Date    HGBA1C 11.0 (H) 02/26/2024       Assessment / Plan        Problem List Items Addressed This Visit    None  Visit Diagnoses       Unstable angina    -  Primary    Relevant Medications    metoprolol succinate XL (TOPROL-XL) 25 MG 24 hr tablet    amLODIPine (NORVASC) 10 MG tablet    Other Relevant Orders    Case Request Cath Lab: Left Heart Cath (Completed)    Dyspnea on exertion        Relevant Orders    Adult Transthoracic Echo Complete W/ Cont if Necessary Per Protocol            1. Angina  2. Dyspnea     Her blood pressure is good. Her heart rate is a little high. Due to current symptoms of worsening shortness of breath as well as an anginal episode, I recommend  another heart catheterization. An order has been placed for left heart catheterization and has been scheduled for Friday, 03/22/2024. An order has been placed for transthoracic echocardiogram. She will continue taking aspirin, Plavix, Lipitor, fenofibrate (Tricor), and lisinopril-hydrochlorothiazide. I will start her on a beta blocker of metoprolol succinate (Toprol)25 mg taking 1 tablet daily and amlodipine (Norvasc) 10 mg taking 0.5 tablet daily. Prescriptions have been sent.     The patient will follow up       Transcribed from ambient dictation for Duncan Norton DO by Mayr Winter.  03/13/24   10:22 CDT    Patient or patient representative verbalized consent to the visit recording.  I have personally performed the services described in this document as transcribed by the above individual, and it is both accurate and complete.  Duncan Norton DO  3/17/2024  20:02 CDT

## 2024-03-13 NOTE — TELEPHONE ENCOUNTER
Placed a call to inform pt her physical therapy order has been faxed and to R/S pts appt N/A [id number]

## 2024-03-13 NOTE — TELEPHONE ENCOUNTER
Called patient to give her the results of the MRI of the lumbar spine.  Patient is can go for CT abdomen and pelvis with contrast to evaluate the renal cyst.  We will also get the patient set to do therapy at [facility].

## 2024-03-14 ENCOUNTER — HOSPITAL ENCOUNTER (OUTPATIENT)
Dept: MAMMOGRAPHY | Facility: HOSPITAL | Age: 59
Discharge: HOME | End: 2024-03-14
Payer: COMMERCIAL

## 2024-03-14 ENCOUNTER — HOSPITAL ENCOUNTER (OUTPATIENT)
Dept: CT IMAGING | Facility: HOSPITAL | Age: 59
Discharge: HOME | End: 2024-03-14
Payer: COMMERCIAL

## 2024-03-14 DIAGNOSIS — Z87.891 PERSONAL HISTORY OF TOBACCO USE, PRESENTING HAZARDS TO HEALTH: ICD-10-CM

## 2024-03-14 DIAGNOSIS — R93.89 ABNORMAL CHEST CT: Primary | ICD-10-CM

## 2024-03-14 DIAGNOSIS — Z12.2 SCREENING FOR MALIGNANT NEOPLASM OF RESPIRATORY ORGAN: ICD-10-CM

## 2024-03-14 DIAGNOSIS — Z12.31 SCREENING MAMMOGRAM FOR BREAST CANCER: ICD-10-CM

## 2024-03-14 PROCEDURE — 77063 BREAST TOMOSYNTHESIS BI: CPT

## 2024-03-14 PROCEDURE — 77067 SCR MAMMO BI INCL CAD: CPT

## 2024-03-14 PROCEDURE — 71271 CT THORAX LUNG CANCER SCR C-: CPT

## 2024-03-17 PROBLEM — R06.09 DYSPNEA ON EXERTION: Status: ACTIVE | Noted: 2024-03-17

## 2024-03-19 ENCOUNTER — OFFICE VISIT (OUTPATIENT)
Dept: OBSTETRICS AND GYNECOLOGY | Age: 59
End: 2024-03-19
Payer: COMMERCIAL

## 2024-03-19 VITALS
WEIGHT: 150 LBS | SYSTOLIC BLOOD PRESSURE: 116 MMHG | DIASTOLIC BLOOD PRESSURE: 74 MMHG | HEIGHT: 60 IN | BODY MASS INDEX: 29.45 KG/M2

## 2024-03-19 DIAGNOSIS — Z12.31 SCREENING MAMMOGRAM FOR BREAST CANCER: ICD-10-CM

## 2024-03-19 DIAGNOSIS — Z12.4 ENCOUNTER FOR SCREENING FOR CERVICAL CANCER: ICD-10-CM

## 2024-03-19 DIAGNOSIS — Z01.419 WELL WOMAN EXAM WITH ROUTINE GYNECOLOGICAL EXAM: Primary | ICD-10-CM

## 2024-03-19 PROCEDURE — 87624 HPV HI-RISK TYP POOLED RSLT: CPT | Performed by: OBSTETRICS & GYNECOLOGY

## 2024-03-19 PROCEDURE — G0123 SCREEN CERV/VAG THIN LAYER: HCPCS | Performed by: OBSTETRICS & GYNECOLOGY

## 2024-03-19 PROCEDURE — 99386 PREV VISIT NEW AGE 40-64: CPT | Performed by: OBSTETRICS & GYNECOLOGY

## 2024-03-19 PROCEDURE — 99459 PELVIC EXAMINATION: CPT | Performed by: OBSTETRICS & GYNECOLOGY

## 2024-03-21 LAB
GEN CATEG CVX/VAG CYTO-IMP: NORMAL
HPV I/H RISK 4 DNA CVX QL PROBE+SIG AMP: NOT DETECTED
LAB AP CASE REPORT: NORMAL
LAB AP GYN ADDITIONAL INFORMATION: NORMAL
Lab: NORMAL
PATH INTERP SPEC-IMP: NORMAL
STAT OF ADQ CVX/VAG CYTO-IMP: NORMAL

## 2024-03-22 ENCOUNTER — HOSPITAL ENCOUNTER (OUTPATIENT)
Facility: HOSPITAL | Age: 59
Setting detail: HOSPITAL OUTPATIENT SURGERY
Discharge: HOME | End: 2024-03-22
Attending: EMERGENCY MEDICINE | Admitting: EMERGENCY MEDICINE
Payer: COMMERCIAL

## 2024-03-22 VITALS
BODY MASS INDEX: 28.09 KG/M2 | DIASTOLIC BLOOD PRESSURE: 74 MMHG | HEART RATE: 64 BPM | RESPIRATION RATE: 18 BRPM | SYSTOLIC BLOOD PRESSURE: 135 MMHG | OXYGEN SATURATION: 97 % | HEIGHT: 61 IN | WEIGHT: 148.8 LBS | TEMPERATURE: 97.7 F

## 2024-03-22 DIAGNOSIS — I20.0 UNSTABLE ANGINA: ICD-10-CM

## 2024-03-22 LAB
ANION GAP SERPL CALCULATED.3IONS-SCNC: 12 MMOL/L (ref 5–15)
BUN SERPL-MCNC: 29 MG/DL (ref 6–20)
BUN/CREAT SERPL: 35.4 (ref 7–25)
CALCIUM SPEC-SCNC: 10 MG/DL (ref 8.6–10.5)
CHLORIDE SERPL-SCNC: 98 MMOL/L (ref 98–107)
CO2 SERPL-SCNC: 24 MMOL/L (ref 22–29)
CREAT SERPL-MCNC: 0.82 MG/DL (ref 0.57–1)
DEPRECATED RDW RBC AUTO: 41 FL (ref 37–54)
EGFRCR SERPLBLD CKD-EPI 2021: 83.5 ML/MIN/1.73
ERYTHROCYTE [DISTWIDTH] IN BLOOD BY AUTOMATED COUNT: 14.2 % (ref 12.3–15.4)
GLUCOSE SERPL-MCNC: 311 MG/DL (ref 65–99)
HCT VFR BLD AUTO: 39.3 % (ref 34–46.6)
HGB BLD-MCNC: 13 G/DL (ref 12–15.9)
INR PPP: 0.89 (ref 0.91–1.09)
MCH RBC QN AUTO: 26.4 PG (ref 26.6–33)
MCHC RBC AUTO-ENTMCNC: 33.1 G/DL (ref 31.5–35.7)
MCV RBC AUTO: 79.9 FL (ref 79–97)
PLATELET # BLD AUTO: 370 10*3/MM3 (ref 140–450)
PMV BLD AUTO: 11 FL (ref 6–12)
POTASSIUM SERPL-SCNC: 4.3 MMOL/L (ref 3.5–5.2)
PROTHROMBIN TIME: 12.4 SECONDS (ref 11.8–14.8)
RBC # BLD AUTO: 4.92 10*6/MM3 (ref 3.77–5.28)
SODIUM SERPL-SCNC: 134 MMOL/L (ref 136–145)
WBC NRBC COR # BLD AUTO: 8.65 10*3/MM3 (ref 3.4–10.8)

## 2024-03-22 PROCEDURE — 85610 PROTHROMBIN TIME: CPT | Performed by: EMERGENCY MEDICINE

## 2024-03-22 PROCEDURE — 25010000002 MIDAZOLAM PER 1 MG: Performed by: EMERGENCY MEDICINE

## 2024-03-22 PROCEDURE — 93458 L HRT ARTERY/VENTRICLE ANGIO: CPT | Performed by: EMERGENCY MEDICINE

## 2024-03-22 PROCEDURE — C1769 GUIDE WIRE: HCPCS | Performed by: EMERGENCY MEDICINE

## 2024-03-22 PROCEDURE — C1894 INTRO/SHEATH, NON-LASER: HCPCS | Performed by: EMERGENCY MEDICINE

## 2024-03-22 PROCEDURE — 25010000002 DIPHENHYDRAMINE PER 50 MG: Performed by: EMERGENCY MEDICINE

## 2024-03-22 PROCEDURE — 25010000002 HEPARIN (PORCINE) PER 1000 UNITS: Performed by: EMERGENCY MEDICINE

## 2024-03-22 PROCEDURE — 99153 MOD SED SAME PHYS/QHP EA: CPT | Performed by: EMERGENCY MEDICINE

## 2024-03-22 PROCEDURE — 25510000001 IOPAMIDOL PER 1 ML: Performed by: EMERGENCY MEDICINE

## 2024-03-22 PROCEDURE — 80048 BASIC METABOLIC PNL TOTAL CA: CPT | Performed by: EMERGENCY MEDICINE

## 2024-03-22 PROCEDURE — 99152 MOD SED SAME PHYS/QHP 5/>YRS: CPT | Performed by: EMERGENCY MEDICINE

## 2024-03-22 PROCEDURE — C1760 CLOSURE DEV, VASC: HCPCS | Performed by: EMERGENCY MEDICINE

## 2024-03-22 PROCEDURE — S0260 H&P FOR SURGERY: HCPCS | Performed by: EMERGENCY MEDICINE

## 2024-03-22 PROCEDURE — 25010000002 FENTANYL CITRATE (PF) 50 MCG/ML SOLUTION: Performed by: EMERGENCY MEDICINE

## 2024-03-22 PROCEDURE — 85027 COMPLETE CBC AUTOMATED: CPT | Performed by: EMERGENCY MEDICINE

## 2024-03-22 RX ORDER — VERAPAMIL HYDROCHLORIDE 2.5 MG/ML
INJECTION, SOLUTION INTRAVENOUS
Status: DISCONTINUED | OUTPATIENT
Start: 2024-03-22 | End: 2024-03-22 | Stop reason: HOSPADM

## 2024-03-22 RX ORDER — SODIUM CHLORIDE 9 MG/ML
100 INJECTION, SOLUTION INTRAVENOUS CONTINUOUS
Status: CANCELLED | OUTPATIENT
Start: 2024-03-22

## 2024-03-22 RX ORDER — MIDAZOLAM HYDROCHLORIDE 1 MG/ML
INJECTION INTRAMUSCULAR; INTRAVENOUS
Status: DISCONTINUED | OUTPATIENT
Start: 2024-03-22 | End: 2024-03-22 | Stop reason: HOSPADM

## 2024-03-22 RX ORDER — LIDOCAINE HYDROCHLORIDE 20 MG/ML
INJECTION, SOLUTION INFILTRATION; PERINEURAL
Status: DISCONTINUED | OUTPATIENT
Start: 2024-03-22 | End: 2024-03-22 | Stop reason: HOSPADM

## 2024-03-22 RX ORDER — HEPARIN SODIUM 1000 [USP'U]/ML
INJECTION, SOLUTION INTRAVENOUS; SUBCUTANEOUS
Status: DISCONTINUED | OUTPATIENT
Start: 2024-03-22 | End: 2024-03-22 | Stop reason: HOSPADM

## 2024-03-22 RX ORDER — SODIUM CHLORIDE 0.9 % (FLUSH) 0.9 %
10 SYRINGE (ML) INJECTION AS NEEDED
Status: DISCONTINUED | OUTPATIENT
Start: 2024-03-22 | End: 2024-03-22 | Stop reason: HOSPADM

## 2024-03-22 RX ORDER — ACETAMINOPHEN 325 MG/1
650 TABLET ORAL EVERY 4 HOURS PRN
Status: CANCELLED | OUTPATIENT
Start: 2024-03-22

## 2024-03-22 RX ORDER — NITROGLYCERIN 0.4 MG/1
0.4 TABLET SUBLINGUAL
Status: CANCELLED | OUTPATIENT
Start: 2024-03-22

## 2024-03-22 RX ORDER — SODIUM CHLORIDE 0.9 % (FLUSH) 0.9 %
10 SYRINGE (ML) INJECTION EVERY 12 HOURS SCHEDULED
Status: DISCONTINUED | OUTPATIENT
Start: 2024-03-22 | End: 2024-03-22 | Stop reason: HOSPADM

## 2024-03-22 RX ORDER — FENTANYL CITRATE 50 UG/ML
INJECTION, SOLUTION INTRAMUSCULAR; INTRAVENOUS
Status: DISCONTINUED | OUTPATIENT
Start: 2024-03-22 | End: 2024-03-22 | Stop reason: HOSPADM

## 2024-03-22 RX ORDER — DIPHENHYDRAMINE HYDROCHLORIDE 50 MG/ML
INJECTION INTRAMUSCULAR; INTRAVENOUS
Status: DISCONTINUED | OUTPATIENT
Start: 2024-03-22 | End: 2024-03-22 | Stop reason: HOSPADM

## 2024-03-22 NOTE — OP NOTE
[facility]    Date of procedure: 3/22/2024     Procedures performed:     1.  Access to the right radial artery via modified Seldinger technique  2.  Left heart catheterization with retrograde across the aortic valve to the left ventricle where pressures were recorded  3.  LV ventriculography  4.  Selective bilateral coronary angiography  5.  Patent hemostasis achieved to the right radial artery access site using a TR band  6.  Access to the right femoral artery via modified Seldinger technique and ultrasound guidance  7.  Patent hemostasis achieved in the right femoral artery access site using a 6 [place] Angio-Seal closure device  8.  Conscious sedation with continuous hemodynamic monitoring for 40 minutes        Risk, Benefits, and Alternatives discussed with the patient and/or family.  Plan is for moderate sedation and the patient agrees to proceed with the procedure.  An immediate assessment was done prior to the administration of moderate sedation     Indication: Unstable angina with progressively worsening episodes of typical angina and dyspnea on exertion, known nonobstructive coronary artery disease  Premedication: Versed, fentanyl  Contrast: Isovue 120 cc  Radiation: Flouro time= 8.1 minutes. Air Kerma= 307 mGy  Catheters: 6Fr JL4, 6Fr JR4, 6Fr angled pigtail, 3 DRC, TIG      Procedural details:    The patient was brought to the cath lab and prepped and draped in the usual fashion.       I supervised the administration of conscious sedation by nursing staff throughout the case.  First dose was given at 1006 and the end of my face-to-face encounter was at 1046, accounting for a total of 40 minutes of supervision.  During the case, continuous pulse oximetry, heart rate, blood pressure, and patient status were monitored.     Findings:    Hemodynamics:      Aortic: 123/70 mmHg  LV: 122/2 mmHg  LVEDP: 26 mmHg    Left ventriculography:  1. The contractility of the left ventricle is  normal.  Estimated ejection fraction 60%.  2.  No significant gradient across the aortic valve on pullback    Selective coronary angiography:     Left Main: Large-caliber vessel that bifurcates into the LAD and left circumflex coronary arteries, no angiographic evidence of stenosis, GERMAIN-3 flow    LAD: Large caliber vessel with mild disease noted in the mid segment, GERMAIN-3 flow    Diagonals: First diagonal is moderate caliber with mild disease noted in the proximal segment, second diagonal small caliber    Left circumflex: Moderate caliber vessel with mild disease noted in the proximal segment, GERMAIN-3 flow    Obtuse marginals: The first obtuse marginal branch is moderate caliber with no significant disease, the second OM is small caliber with no significant disease    RCA: Large-caliber vessel with 40 to 50% stenosis in the proximal segment, remainder the vessel has mild luminal irregularities, GERMAIN-3 flow    PDA/TRAVIS: Both are moderate caliber with no significant disease        Estimated Blood Loss: 10 cc    Specimens: None    Complications: None       Impression:  1.  Coronary artery disease as described above including a 40 to 50% stenosis in the proximal RCA with minor disease in other vessels  2.  Diabetes  3.  Hypertension  4.  Peripheral vascular disease     Plan:   1.  Monitor post procedure and discharge home later today  2.  Continue current cardiac regimen without changes  3.  Follow-up in the [facility] cardiology clinic in 3 months to continue to optimize her medical regimen    Duncan Norton,   Interventional cardiology  [facility]  March 22, 2024

## 2024-03-22 NOTE — H&P
Patient seen and examined at bedside.  The history and physical not changed as below.    We will be performing a diagnostic left heart catheterization today with possible intervention based on findings.    Risk, benefits and alternatives were explained to the patient he wished to proceed.          Chief Complaint   Patient presents with    Chest Pain       NEW PT         History of Present Illness     Catrachita Argueta is a 58-year-old female who presents for an initial visit.     She underwent a positive stress test in 2017 which led to a heart catheterization in 2017 with Dr. Ana Hernandez at [facility] in [place], [place] and was diagnosed with coronary artery disease. She underwent another stress test in 2018 which showed anterior ischemia and a heart catheterization was suggested. She underwent another heart catheterization in 09/2018 which showed 70 percent blockage in a small diagonal branch, which was not fixed. She confirms never having any stents. She underwent a stress test in 2022 which was inconclusive.      She had not had any troubles until about 3 or 4 weeks ago, when she was coming home from [facility], and she experienced 5 or 6 sharp chest pains. It did happen again but was not as bad. She did not go to the emergency room. She mentioned it to her primary provider a few weeks ago during her annual checkup. Her doctor did an EKG in the office and compared it to the one she had previously. He did feel she had a small heart attack or cardiac episode of some kind. He referred her here to cardiology.      She does not remember what her symptoms were before her heart catheterization. According to her previous office notes, she was experiencing heavy pressure in her chest and shortness of breath. She reports that she has ongoing shortness of breath but lately it is worse. Other than the chest pains she experienced a few weeks ago, she denies any current chest pain, although she does not know what a heart attack  feels like. She is not sure if she has sweating episodes with chest pain because she has hot flashes. She denies nausea.     She has comorbidities of hypertension, hyperlipidemia, and diabetes mellitus.      Her blood pressure today is controlled today at 120/62 mmHg. Her heart rate is 113 beats per minute.      She cannot do the treadmill test because of her leg.     The patient is a smoker. She is .      The patient is currently taking atorvastatin (Lipitor) and fenofibrate (Tricor) for hyperlipidemia. She takes aspirin, Plavix, as well as lisinopril-hydrochlorothiazide for hypertension.         Review of Systems   Constitutional: Negative for diaphoresis, fever and malaise/fatigue.   HENT:  Negative for congestion.    Eyes:  Negative for vision loss in left eye and vision loss in right eye.   Cardiovascular:  Positive for chest pain. Negative for claudication, dyspnea on exertion, irregular heartbeat, leg swelling, orthopnea, palpitations and syncope.   Respiratory:  Positive for shortness of breath. Negative for cough and wheezing.    Hematologic/Lymphatic: Negative for adenopathy.   Skin:  Negative for rash.   Musculoskeletal:  Negative for joint pain and joint swelling.   Gastrointestinal:  Negative for abdominal pain, diarrhea, nausea and vomiting.   Neurological:  Negative for excessive daytime sleepiness, dizziness, focal weakness, light-headedness, numbness and weakness.   Psychiatric/Behavioral:  Negative for depression. The patient does not have insomnia.          Otherwise complete ROS reviewed and negative except as mentioned in the HPI.        Past Medical History:   Medical History        Past Medical History:   Diagnosis Date    Anxiety      Depression      Diabetes mellitus      GA (granuloma annulare)      GERD (gastroesophageal reflux disease)      Hypertension      PAD (peripheral artery disease)              Past Surgical History:  Surgical History         Past Surgical History:    Procedure Laterality Date    AORTAGRAM Left 2023     Procedure: LEFT LOWER EXTREMITY ANGIOGRAM, BALLOON ANGIOPLASTY MYNX CLOSURE;  Surgeon: Kei Jones DO;  Location: [facility] HYBRID OR 12;  Service: Vascular;  Laterality: Left;    CARDIAC CATHETERIZATION         SECTION        COLONOSCOPY N/A 2023     Procedure: COLONOSCOPY WITH ANESTHESIA;  Surgeon: Mushtaq Crews MD;  Location: [facility] ENDOSCOPY;  Service: Gastroenterology;  Laterality: N/A;  preop; diarrhea  postop; polyps; diverticulosis   PCP Ana Hernandez     ILIAC ARTERY STENT Bilateral      IR ANGIOGRAM EXTREMITY UNILATERAL                Family History: family history includes No Known Problems in her father and mother. She was adopted.     Social History:  reports that she has been smoking cigarettes. She has a 30 pack-year smoking history. She has never used smokeless tobacco. She reports that she does not drink alcohol and does not use drugs.     Medications:          Prior to Admission medications    Medication Sig Start Date End Date Taking? Authorizing Provider   aspirin 81 MG EC tablet Take 1 tablet by mouth Daily.     Yes Provider, MD Sanjuana   atorvastatin (LIPITOR) 40 MG tablet Take 1 tablet by mouth Daily. 23   Yes Ambrocio Gutierrez APRN   clopidogrel (PLAVIX) 75 MG tablet Take 1 tablet by mouth Daily. 24   Yes William Ellis MD   fenofibrate (TRICOR) 145 MG tablet Take 1 tablet by mouth Daily. 24   Yes William Ellis MD   fluticasone (FLONASE) 50 MCG/ACT nasal spray 1 spray into the nostril(s) as directed by provider Daily. 23   Yes William Ellis MD   glipizide (Glucotrol) 10 MG tablet Take 1 tablet by mouth 2 (Two) Times a Day Before Meals. 24   Yes William Ellis MD   lisinopril-hydrochlorothiazide (PRINZIDE,ZESTORETIC) 20-12.5 MG per tablet Take 1 tablet by mouth 2 (Two) Times a Day. 24   Yes William Ellis MD   omeprazole  "(priLOSEC) 40 MG capsule Take 1 capsule by mouth Daily. 2/27/24   Yes William Ellis MD   vitamin D (ERGOCALCIFEROL) 1.25 MG (21882 UT) capsule capsule Take 1 capsule by mouth 1 (One) Time Per Week. 2/27/24   Yes William Ellis MD   vitamin D (ERGOCALCIFEROL) 1.25 MG (70706 UT) capsule capsule Take 1 capsule by mouth 1 (One) Time Per Week. 2/27/24   Yes William Ellis MD   amLODIPine (NORVASC) 10 MG tablet Take 1 tablet by mouth Daily. 3/13/24     Duncan Norton DO   metoprolol succinate XL (TOPROL-XL) 25 MG 24 hr tablet Take 1 tablet by mouth Daily. 3/13/24     Duncan Norton DO   empagliflozin (Jardiance) 10 MG tablet tablet Take 1 tablet by mouth Daily. 2/28/24 3/13/24   William Ellis MD   hydrOXYzine pamoate (Vistaril) 25 MG capsule Take 1 capsule by mouth 3 (Three) Times a Day As Needed for Anxiety. 9/20/23 3/13/24   William Ellis MD   metFORMIN (GLUCOPHAGE) 1000 MG tablet Take 1 tablet by mouth 2 (Two) Times a Day With Meals. 2/27/24 3/13/24   William Ellis MD      Allergies:  Allergies   No Known Allergies        Objective      Vital Signs: /62   Pulse 113   Ht 152.4 cm (60\")   Wt 68 kg (150 lb)   SpO2 99%   BMI 29.29 kg/m²      Vitals and nursing note reviewed.   Constitutional:       Appearance: Normal and healthy appearance. Well-developed and not in distress.   Eyes:      Extraocular Movements: Extraocular movements intact.      Pupils: Pupils are equal, round, and reactive to light.   HENT:      Head: Normocephalic and atraumatic.    Mouth/Throat:      Pharynx: Oropharynx is clear.   Neck:      Vascular: JVD normal.      Trachea: Trachea normal.   Pulmonary:      Effort: Pulmonary effort is normal.      Breath sounds: Normal breath sounds. No wheezing. No rhonchi. No rales.   Cardiovascular:      PMI at left midclavicular line. Normal rate. Regular rhythm. Normal S1. Normal S2.       Murmurs: There is " a grade 2/6 systolic murmur.      No gallop.  No click. No rub.   Pulses:     Dorsalis pedis: 2+ bilaterally.     Posterior tibial: 2+ bilaterally.  Abdominal:      General: Bowel sounds are normal.      Palpations: Abdomen is soft.      Tenderness: There is no abdominal tenderness.   Musculoskeletal: Normal range of motion.      Cervical back: Normal range of motion and neck supple. Skin:     General: Skin is warm and dry.      Capillary Refill: Capillary refill takes less than 2 seconds.   Feet:      Right foot:      Skin integrity: Skin integrity normal.      Left foot:      Skin integrity: Skin integrity normal.   Neurological:      Mental Status: Alert and oriented to person, place and time.      Sensory: Sensation is intact.      Motor: Motor function is intact.      Coordination: Coordination is intact.   Psychiatric:         Speech: Speech normal.         Behavior: Behavior is cooperative.            Results Reviewed:     Lab work completed in 02/2024 was reviewed with the patient. Hemoglobin A1c was 11.0 percent. Lipid panel showed LDL cholesterol slightly elevated.      Stress test performed in 2018 was reviewed. Anterior ischemia, ejection fraction of 83 percent. Dr. Mercer suggested cardiac catheterization.       Stress test performed in 09/2022 was reviewed. Impressions are consistent with a study indicating uncertain risk according to Dr. Saldana.               Procedures              Lab Results   Component Value Date     TRIG 270 (H) 02/26/2024     HDL 31 (L) 02/26/2024     VLDL 44 (H) 02/26/2024            Lab Results   Component Value Date     HGBA1C 11.0 (H) 02/26/2024         Assessment / Plan         Problem List Items Addressed This Visit    None  Visit Diagnoses         Unstable angina    -  Primary     Relevant Medications     metoprolol succinate XL (TOPROL-XL) 25 MG 24 hr tablet     amLODIPine (NORVASC) 10 MG tablet     Other Relevant Orders     Case Request Cath Lab: Left Heart Cath  (Completed)     Dyspnea on exertion         Relevant Orders     Adult Transthoracic Echo Complete W/ Cont if Necessary Per Protocol                1. Angina  2. Dyspnea     Her blood pressure is good. Her heart rate is a little high. Due to current symptoms of worsening shortness of breath as well as an anginal episode, I recommend another heart catheterization. An order has been placed for left heart catheterization and has been scheduled for Friday, 03/22/2024. An order has been placed for transthoracic echocardiogram. She will continue taking aspirin, Plavix, Lipitor, fenofibrate (Tricor), and lisinopril-hydrochlorothiazide. I will start her on a beta blocker of metoprolol succinate (Toprol)25 mg taking 1 tablet daily and amlodipine (Norvasc) 10 mg taking 0.5 tablet daily. Prescriptions have been sent.      The patient will follow up         Transcribed from ambient dictation for Duncan Norton DO by Mary Winter.  03/13/24   10:22 CDT     Patient or patient representative verbalized consent to the visit recording.  I have personally performed the services described in this document as transcribed by the above individual, and it is both accurate and complete.  Duncan Norton DO  3/17/2024  20:02 CDT

## 2024-03-25 NOTE — PROGRESS NOTES
"Subjective     Catrachita Argueta is a 58 y.o. female here for annual exam. Pt is unsure when her last pap smear was done. Denies hx abnormal pap smears. Reports menopause years ago. Denies PMB.  Is not on HRT. Denies menopausal symptoms. Denies vaginal dryness, dyspareunia, hot flashes. Denies breast changes. Is up to date on colonoscopy.  Referred by PCP for annual exam.     Gynecologic Exam  The patient's pertinent negatives include no pelvic pain or vaginal discharge. Pertinent negatives include no constipation, diarrhea, nausea or vomiting.         /74 (BP Location: Left arm, Patient Position: Sitting, Cuff Size: Adult)   Ht 152.4 cm (60\")   Wt 68 kg (150 lb)   LMP  (LMP Unknown) Comment: 2015ish  Breastfeeding No   BMI 29.29 kg/m²     Outpatient Encounter Medications as of 3/19/2024   Medication Sig Dispense Refill    amLODIPine (NORVASC) 10 MG tablet Take 1 tablet by mouth Daily. 90 tablet 3    aspirin 81 MG EC tablet Take 1 tablet by mouth Daily.      atorvastatin (LIPITOR) 40 MG tablet Take 1 tablet by mouth Daily. 90 tablet 3    clopidogrel (PLAVIX) 75 MG tablet Take 1 tablet by mouth Daily. 90 tablet 3    fenofibrate (TRICOR) 145 MG tablet Take 1 tablet by mouth Daily. 90 tablet 3    fluticasone (FLONASE) 50 MCG/ACT nasal spray 1 spray into the nostril(s) as directed by provider Daily. 16 g 11    glipizide (Glucotrol) 10 MG tablet Take 1 tablet by mouth 2 (Two) Times a Day Before Meals. 180 tablet 3    lisinopril-hydrochlorothiazide (PRINZIDE,ZESTORETIC) 20-12.5 MG per tablet Take 1 tablet by mouth 2 (Two) Times a Day. 180 tablet 3    metFORMIN (GLUCOPHAGE) 1000 MG tablet Take 1 tablet by mouth 2 (Two) Times a Day With Meals.      metoprolol succinate XL (TOPROL-XL) 25 MG 24 hr tablet Take 1 tablet by mouth Daily. 90 tablet 3    omeprazole (priLOSEC) 40 MG capsule Take 1 capsule by mouth Daily. 90 capsule 3    vitamin D (ERGOCALCIFEROL) 1.25 MG (31454 UT) capsule capsule Take 1 capsule by mouth 1 (One) " Time Per Week. 5 capsule 3    [DISCONTINUED] vitamin D (ERGOCALCIFEROL) 1.25 MG (23040 UT) capsule capsule Take 1 capsule by mouth 1 (One) Time Per Week. 12 capsule 3     No facility-administered encounter medications on file as of 3/19/2024.       Surgical History  Past Surgical History:   Procedure Laterality Date    AORTAGRAM Left 2023    Procedure: LEFT LOWER EXTREMITY ANGIOGRAM, BALLOON ANGIOPLASTY MYNX CLOSURE;  Surgeon: Kei Jones DO;  Location: [facility] HYBRID OR 12;  Service: Vascular;  Laterality: Left;    CARDIAC CATHETERIZATION      CARDIAC CATHETERIZATION N/A 3/22/2024    Procedure: Left Heart Cath;  Surgeon: Duncan Norton DO;  Location: [facility] CATH INVASIVE LOCATION;  Service: Cardiovascular;  Laterality: N/A;     SECTION      COLONOSCOPY N/A 2023    Procedure: COLONOSCOPY WITH ANESTHESIA;  Surgeon: Mushtaq Crews MD;  Location: [facility] ENDOSCOPY;  Service: Gastroenterology;  Laterality: N/A;  preop; diarrhea  postop; polyps; diverticulosis   PCP Ana Hernandez     ILIAC ARTERY STENT Bilateral     IR ANGIOGRAM EXTREMITY UNILATERAL         Family History  Family History   Adopted: Yes   Problem Relation Age of Onset    No Known Problems Mother     No Known Problems Father        The following portions of the patient's history were reviewed and updated as appropriate: allergies, current medications, past family history, past medical history, past social history, past surgical history, and problem list.    Review of Systems   Constitutional:  Negative for activity change, appetite change, fatigue, unexpected weight gain and unexpected weight loss.   Respiratory:  Negative for cough, chest tightness, shortness of breath and wheezing.    Cardiovascular:  Negative for chest pain and palpitations.   Gastrointestinal:  Negative for constipation, diarrhea, nausea and vomiting.   Genitourinary:  Positive for amenorrhea. Negative for breast discharge, breast lump, breast  pain, dyspareunia, pelvic pain, pelvic pressure, vaginal bleeding, vaginal discharge and vaginal pain.       Objective   Physical Exam  Exam conducted with a chaperone present.   Constitutional:       Appearance: Normal appearance.   Cardiovascular:      Rate and Rhythm: Normal rate and regular rhythm.      Pulses: Normal pulses.   Pulmonary:      Effort: Pulmonary effort is normal.      Breath sounds: Normal breath sounds.   Chest:   Breasts:     Right: Normal. No inverted nipple, mass, nipple discharge or skin change.      Left: Normal. No inverted nipple, mass, nipple discharge or skin change.   Abdominal:      General: Abdomen is flat. Bowel sounds are normal.      Palpations: Abdomen is soft.   Genitourinary:     Comments: Normal external genitalia, vaginal mucosa mildly atrophic without lesions, cervix smooth without lesions, no prolapse, bimanual exam with anteverted uterus, normal size, no masses, no CMT, nontender, no vaginal discharge  Musculoskeletal:      Cervical back: Normal range of motion and neck supple.   Neurological:      Mental Status: She is alert.   Psychiatric:         Mood and Affect: Mood normal.         Behavior: Behavior normal.         Assessment & Plan   Diagnoses and all orders for this visit:    1. Well woman exam with routine gynecological exam (Primary)  59 y/o CF here for annual exam. Pap smear today. Normal breast and pelvic exam today.   2. Encounter for screening for cervical cancer  -     Liquid-based Pap Smear, Screening  -     HPV DNA Probe, Direct - ThinPrep Vial, Cervix, Endocervix  Pap smear today  3. Screening mammogram for breast cancer  -     Mammo Screening Digital Tomosynthesis Bilateral With CAD; Future        BMI Body mass index is 29.29 kg/m²..   Colonoscopy: Up to date  Mammogram: Ordered today  DEXA:  not applicable  Pap smear, per ASCCP guidelines, Done today  STI screening: declines  Contraception: menoapausal    AVS/Patient education handout on the following  as well w/discussion  Encouraged self breast awareness.  Encouraged proactive weight management and importance of maintaining a healthy weight.   Encouraged regular exercise and the importance of same, in regards to a healthy heart as well as helping to maintain her weight and improving her mental health.            Elisa Harris MD  4/7/2024

## 2024-03-26 ENCOUNTER — HOSPITAL ENCOUNTER (OUTPATIENT)
Dept: CT IMAGING | Facility: HOSPITAL | Age: 59
Discharge: HOME | End: 2024-03-26
Admitting: NURSE PRACTITIONER
Payer: COMMERCIAL

## 2024-03-26 DIAGNOSIS — N28.1 RENAL CYST: ICD-10-CM

## 2024-03-26 LAB — CREAT BLDA-MCNC: 0.7 MG/DL (ref 0.6–1.3)

## 2024-03-26 PROCEDURE — 82565 ASSAY OF CREATININE: CPT

## 2024-03-26 PROCEDURE — 25510000001 IOPAMIDOL 61 % SOLUTION: Performed by: NURSE PRACTITIONER

## 2024-03-26 PROCEDURE — 74178 CT ABD&PLV WO CNTR FLWD CNTR: CPT

## 2024-03-26 RX ADMIN — IOPAMIDOL 100 ML: 612 INJECTION, SOLUTION INTRAVENOUS at 14:43

## 2024-04-12 ENCOUNTER — HOSPITAL ENCOUNTER (OUTPATIENT)
Dept: MRI IMAGING | Facility: HOSPITAL | Age: 59
Discharge: HOME | End: 2024-04-12
Admitting: FAMILY MEDICINE
Payer: COMMERCIAL

## 2024-04-12 DIAGNOSIS — R93.89 ABNORMAL CHEST CT: ICD-10-CM

## 2024-04-12 LAB — CREAT BLDA-MCNC: 0.8 MG/DL (ref 0.6–1.3)

## 2024-04-12 PROCEDURE — 82565 ASSAY OF CREATININE: CPT

## 2024-04-12 PROCEDURE — A9577 INJ MULTIHANCE: HCPCS | Performed by: FAMILY MEDICINE

## 2024-04-12 PROCEDURE — 71552 MRI CHEST W/O & W/DYE: CPT

## 2024-04-12 PROCEDURE — 0 GADOBENATE DIMEGLUMINE 529 MG/ML SOLUTION: Performed by: FAMILY MEDICINE

## 2024-04-12 RX ADMIN — GADOBENATE DIMEGLUMINE 14 ML: 529 INJECTION, SOLUTION INTRAVENOUS at 12:21

## 2024-05-03 ENCOUNTER — HOSPITAL ENCOUNTER (OUTPATIENT)
Dept: CARDIOLOGY | Facility: HOSPITAL | Age: 59
Discharge: HOME | End: 2024-05-03
Payer: COMMERCIAL

## 2024-05-03 VITALS
DIASTOLIC BLOOD PRESSURE: 74 MMHG | SYSTOLIC BLOOD PRESSURE: 116 MMHG | BODY MASS INDEX: 27.94 KG/M2 | WEIGHT: 148 LBS | HEIGHT: 61 IN

## 2024-05-03 DIAGNOSIS — R06.09 DYSPNEA ON EXERTION: ICD-10-CM

## 2024-05-03 LAB
BH CV ECHO MEAS - AO MAX PG: 6.3 MMHG
BH CV ECHO MEAS - AO MEAN PG: 3 MMHG
BH CV ECHO MEAS - AO ROOT DIAM: 2.2 CM
BH CV ECHO MEAS - AO V2 MAX: 125 CM/SEC
BH CV ECHO MEAS - AO V2 VTI: 23.6 CM
BH CV ECHO MEAS - AVA(I,D): 2.34 CM2
BH CV ECHO MEAS - EDV(CUBED): 91.1 ML
BH CV ECHO MEAS - EDV(MOD-SP2): 37.7 ML
BH CV ECHO MEAS - EDV(MOD-SP4): 31.2 ML
BH CV ECHO MEAS - EF(MOD-BP): 63.7 %
BH CV ECHO MEAS - EF(MOD-SP2): 62.3 %
BH CV ECHO MEAS - EF(MOD-SP4): 64.1 %
BH CV ECHO MEAS - ESV(CUBED): 38.6 ML
BH CV ECHO MEAS - ESV(MOD-SP2): 14.2 ML
BH CV ECHO MEAS - ESV(MOD-SP4): 11.2 ML
BH CV ECHO MEAS - FS: 24.9 %
BH CV ECHO MEAS - IVS/LVPW: 0.96 CM
BH CV ECHO MEAS - IVSD: 0.74 CM
BH CV ECHO MEAS - LA DIMENSION: 3 CM
BH CV ECHO MEAS - LAT PEAK E' VEL: 9.8 CM/SEC
BH CV ECHO MEAS - LV DIASTOLIC VOL/BSA (35-75): 18.8 CM2
BH CV ECHO MEAS - LV MASS(C)D: 104.7 GRAMS
BH CV ECHO MEAS - LV MAX PG: 2.9 MMHG
BH CV ECHO MEAS - LV MEAN PG: 2 MMHG
BH CV ECHO MEAS - LV SYSTOLIC VOL/BSA (12-30): 6.7 CM2
BH CV ECHO MEAS - LV V1 MAX: 84.8 CM/SEC
BH CV ECHO MEAS - LV V1 VTI: 17.6 CM
BH CV ECHO MEAS - LVIDD: 4.5 CM
BH CV ECHO MEAS - LVIDS: 3.4 CM
BH CV ECHO MEAS - LVOT AREA: 3.1 CM2
BH CV ECHO MEAS - LVOT DIAM: 2 CM
BH CV ECHO MEAS - LVPWD: 0.77 CM
BH CV ECHO MEAS - MED PEAK E' VEL: 9.6 CM/SEC
BH CV ECHO MEAS - MV A MAX VEL: 79.5 CM/SEC
BH CV ECHO MEAS - MV DEC TIME: 0.15 SEC
BH CV ECHO MEAS - MV E MAX VEL: 85.6 CM/SEC
BH CV ECHO MEAS - MV E/A: 1.08
BH CV ECHO MEAS - PA V2 MAX: 108.5 CM/SEC
BH CV ECHO MEAS - RAP SYSTOLE: 8 MMHG
BH CV ECHO MEAS - RVSP: 25 MMHG
BH CV ECHO MEAS - SV(LVOT): 55.3 ML
BH CV ECHO MEAS - SV(MOD-SP2): 23.5 ML
BH CV ECHO MEAS - SV(MOD-SP4): 20 ML
BH CV ECHO MEAS - SVI(LVOT): 33.3 ML/M2
BH CV ECHO MEAS - SVI(MOD-SP2): 14.1 ML/M2
BH CV ECHO MEAS - SVI(MOD-SP4): 12 ML/M2
BH CV ECHO MEAS - TAPSE (>1.6): 2.13 CM
BH CV ECHO MEAS - TR MAX PG: 17 MMHG
BH CV ECHO MEAS - TR MAX VEL: 206 CM/SEC
BH CV ECHO MEASUREMENTS AVERAGE E/E' RATIO: 8.82
BH CV XLRA - TDI S': 11.3 CM/SEC
LEFT ATRIUM VOLUME INDEX: 20.1 ML/M2

## 2024-05-03 PROCEDURE — 93306 TTE W/DOPPLER COMPLETE: CPT

## 2024-05-17 ENCOUNTER — HOSPITAL ENCOUNTER (OUTPATIENT)
Dept: ULTRASOUND IMAGING | Facility: HOSPITAL | Age: 59
Discharge: HOME | End: 2024-05-17
Payer: COMMERCIAL

## 2024-05-22 ENCOUNTER — LAB (OUTPATIENT)
Dept: INTERNAL MEDICINE | Facility: CLINIC | Age: 59
End: 2024-05-22
Payer: COMMERCIAL

## 2024-05-22 DIAGNOSIS — E11.9 TYPE 2 DIABETES MELLITUS WITHOUT COMPLICATION, WITHOUT LONG-TERM CURRENT USE OF INSULIN: ICD-10-CM

## 2024-05-22 DIAGNOSIS — E11.9 TYPE 2 DIABETES MELLITUS WITHOUT COMPLICATION, WITHOUT LONG-TERM CURRENT USE OF INSULIN: Primary | ICD-10-CM

## 2024-05-23 ENCOUNTER — OFFICE VISIT (OUTPATIENT)
Dept: NEUROSURGERY | Facility: CLINIC | Age: 59
End: 2024-05-23
Payer: COMMERCIAL

## 2024-05-23 ENCOUNTER — TELEPHONE (OUTPATIENT)
Dept: NEUROSURGERY | Facility: CLINIC | Age: 59
End: 2024-05-23
Payer: COMMERCIAL

## 2024-05-23 VITALS — WEIGHT: 148 LBS | HEIGHT: 61 IN | BODY MASS INDEX: 27.94 KG/M2

## 2024-05-23 DIAGNOSIS — G89.29 CHRONIC LEFT-SIDED LOW BACK PAIN WITH LEFT-SIDED SCIATICA: Primary | ICD-10-CM

## 2024-05-23 DIAGNOSIS — E11.65 TYPE 2 DIABETES MELLITUS WITH HYPERGLYCEMIA, WITHOUT LONG-TERM CURRENT USE OF INSULIN: Primary | ICD-10-CM

## 2024-05-23 DIAGNOSIS — F17.200 SMOKER: ICD-10-CM

## 2024-05-23 DIAGNOSIS — M54.42 CHRONIC LEFT-SIDED LOW BACK PAIN WITH LEFT-SIDED SCIATICA: Primary | ICD-10-CM

## 2024-05-23 DIAGNOSIS — E66.3 OVERWEIGHT WITH BODY MASS INDEX (BMI) OF 27 TO 27.9 IN ADULT: ICD-10-CM

## 2024-05-23 LAB
ALBUMIN SERPL-MCNC: 4.5 G/DL (ref 3.8–4.9)
ALBUMIN/GLOB SERPL: 1.6 {RATIO} (ref 1.2–2.2)
ALP SERPL-CCNC: 52 IU/L (ref 44–121)
ALT SERPL-CCNC: 31 IU/L (ref 0–32)
AST SERPL-CCNC: 21 IU/L (ref 0–40)
BILIRUB SERPL-MCNC: 0.2 MG/DL (ref 0–1.2)
BUN SERPL-MCNC: 15 MG/DL (ref 6–24)
BUN/CREAT SERPL: 18 (ref 9–23)
CALCIUM SERPL-MCNC: 9.8 MG/DL (ref 8.7–10.2)
CHLORIDE SERPL-SCNC: 96 MMOL/L (ref 96–106)
CO2 SERPL-SCNC: 20 MMOL/L (ref 20–29)
CREAT SERPL-MCNC: 0.83 MG/DL (ref 0.57–1)
EGFRCR SERPLBLD CKD-EPI 2021: 82 ML/MIN/1.73
GLOBULIN SER CALC-MCNC: 2.8 G/DL (ref 1.5–4.5)
GLUCOSE SERPL-MCNC: 355 MG/DL (ref 70–99)
HBA1C MFR BLD: 10.8 % (ref 4.8–5.6)
POTASSIUM SERPL-SCNC: 4.5 MMOL/L (ref 3.5–5.2)
PROT SERPL-MCNC: 7.3 G/DL (ref 6–8.5)
SODIUM SERPL-SCNC: 133 MMOL/L (ref 134–144)

## 2024-05-23 PROCEDURE — 99213 OFFICE O/P EST LOW 20 MIN: CPT | Performed by: NEUROLOGICAL SURGERY

## 2024-05-23 NOTE — TELEPHONE ENCOUNTER
Called to obtain therapy notes; however, no one answered so I left a VM asking for the records to be faxed.  I did leave my direct # in case they need to contact me.    JODI HAWKINS [facility]  PHYSICIAN LEAD  DR SMILEY GUTIERREZ  [facility]      IT IS OKAY FOR THE HUB TO DELIVER THIS INFORMATION TO THE PATIENT IF THEY RECEIVE THIS CALL BACK

## 2024-05-23 NOTE — TELEPHONE ENCOUNTER
Malu sanderson/[facility] called back and states patient only did her initial PT evaluation and cancelled all her follow up appts.  She states the patient gave the reasoning that she is too busy to complete therapy at this time.    JODI HAWKINS [facility]  PHYSICIAN LEAD  DR SMILEY GUTIERREZ  [facility] [place] [facility]

## 2024-05-23 NOTE — PATIENT INSTRUCTIONS
"PATIENT TO CONTINUE TO FOLLOW UP WITH HER PRIMARY CARE PROVIDER FOR YEARLY PHYSICAL EXAMS TO ENSURE COMPLETE HEALTH MAINTENANCE    BMI for Adults  Body mass index (BMI) is a number found using a person's weight and height. BMI can help tell how much of a person's weight is made up of fat. BMI does not measure body fat directly. It is used instead of tests that directly measure body fat, which can be difficult and expensive.  What are BMI measurements used for?  BMI is useful to:  Find out if your weight puts you at higher risk for medical problems.  Help recommend changes, such as in diet and exercise. This can help you reach a healthy weight. BMI screening can be done again to see if these changes are working.  How is BMI calculated?  Your height and weight are measured. The BMI is found from those numbers. This can be done with [place] or metric measurements. Note that charts and online BMI calculators are available to help you find your BMI quickly and easily without doing these calculations.  To calculate your BMI in [place] measurements:  Measure your weight in pounds (lb).  Multiply the number of pounds by 703.  So, for an adult who weighs 150 lb, multiply that number by 703: 150 x 703, which equals 105,450.  Measure your height in inches. Then multiply that number by itself to get a measurement called \"inches squared.\"  So, for an adult who is 70 inches tall, the \"inches squared\" measurement is 70 inches x 70 inches, which equals 4,900 inches squared.  Divide the total from step 2 (number of lb x 703) by the total from step 3 (inches squared): 105,450 ÷ 4,900 = 21.5. This is your BMI.  To calculate your BMI in metric measurements:    Measure your weight in kilograms (kg).  For this example, the weight is 70 kg.  Measure your height in meters (m). Then multiply that number by itself to get a measurement called \"meters squared.\"  So, for an adult who is 1.75 m tall, the \"meters squared\" measurement is 1.75 m x 1.75 " m, which equals 3.1 meters squared.  Divide the number of kilograms (your weight) by the meters squared number. In this example: 70 ÷ 3.1 = 22.6. This is your BMI.  What do the results mean?  BMI charts are used to see if you are underweight, normal weight, overweight, or obese. The following guidelines will be used:  Underweight: BMI less than 18.5.  Normal weight: BMI between 18.5 and 24.9.  Overweight: BMI between 25 and 29.9.  Obese: BMI of 30 or above.  BMI is a tool and cannot diagnose a condition. Talk with your health care provider about what your BMI means for you. Keep these notes in mind:  Weight includes fat and muscle. Someone with a muscular build, such as an athlete, may have a BMI that is higher than 24.9. In cases like these, BMI is not a correct measure of body fat.  If you have a BMI of 25 or higher, your provider may need to do more testing to find out if excess body fat is the cause.  BMI is measured the same way for males and females. Females usually have more body fat than males of the same height and weight.  Where to find more information  For more information about BMI, including tools to quickly find your BMI, go to:  Centers for Disease Control and Prevention: cdc.gov  American Heart Association: heart.org  National Heart, Lung, and Blood [place]: nhlbi.nih.gov  This information is not intended to replace advice given to you by your health care provider. Make sure you discuss any questions you have with your health care provider.  Document Revised: 09/07/2023 Document Reviewed: 08/31/2023  [employer] Patient Education © 2024 [employer]  DASH Eating Plan  DASH stands for Dietary Approaches to Stop Hypertension. The DASH eating plan is a healthy eating plan that has been shown to:  Lower high blood pressure (hypertension).  Reduce your risk for type 2 diabetes, heart disease, and stroke.  Help with weight loss.  What are tips for following this plan?  Reading food labels  Check food  "labels for the amount of salt (sodium) per serving. Choose foods with less than 5 percent of the Daily Value (DV) of sodium. In general, foods with less than 300 milligrams (mg) of sodium per serving fit into this eating plan.  To find whole grains, look for the word \"whole\" as the first word in the ingredient list.  Shopping  Buy products labeled as \"low-sodium\" or \"no salt added.\"  Buy fresh foods. Avoid canned foods and pre-made or frozen meals.  Cooking  Try not to add salt when you cook. Use salt-free seasonings or herbs instead of table salt or sea salt. Check with your health care provider or pharmacist before using salt substitutes.  Do not rodriguez foods. Cook foods in healthy ways, such as baking, boiling, grilling, roasting, or broiling.  Cook using oils that are good for your heart. These include olive, canola, avocado, soybean, and sunflower oil.  Meal planning    Eat a balanced diet. This should include:  4 or more servings of fruits and 4 or more servings of vegetables each day. Try to fill half of your plate with fruits and vegetables.  6-8 servings of whole grains each day.  6 or less servings of lean meat, poultry, or fish each day. 1 oz is 1 serving. A 3 oz (85 g) serving of meat is about the same size as the palm of your hand. One egg is 1 oz (28 g).  2-3 servings of low-fat dairy each day. One serving is 1 cup (237 mL).  1 serving of nuts, seeds, or beans 5 times each week.  2-3 servings of heart-healthy fats. Healthy fats called omega-3 fatty acids are found in foods such as walnuts, flaxseeds, fortified milks, and eggs. These fats are also found in cold-water fish, such as sardines, salmon, and mackerel.  Limit how much you eat of:  Canned or prepackaged foods.  Food that is high in trans fat, such as fried foods.  Food that is high in saturated fat, such as fatty meat.  Desserts and other sweets, sugary drinks, and other foods with added sugar.  Full-fat dairy products.  Do not salt foods before " eating.  Do not eat more than 4 egg yolks a week.  Try to eat at least 2 vegetarian meals a week.  Eat more home-cooked food and less restaurant, buffet, and fast food.  Lifestyle  When eating at a restaurant, ask if your food can be made with less salt or no salt.  If you drink alcohol:  Limit how much you have to:  0-1 drink a day if you are female.  0-2 drinks a day if you are male.  Know how much alcohol is in your drink. In the [place] one drink is one 12 oz bottle of beer (355 mL), one 5 oz glass of wine (148 mL), or one 1½ oz glass of hard liquor (44 mL).  General information  Avoid eating more than 2,300 mg of salt a day. If you have hypertension, you may need to reduce your sodium intake to 1,500 mg a day.  Work with your provider to stay at a healthy body weight or lose weight. Ask what the best weight range is for you.  On most days of the week, get at least 30 minutes of exercise that causes your heart to beat faster. This may include walking, swimming, or biking.  Work with your provider or dietitian to adjust your eating plan to meet your specific calorie needs.  What foods should I eat?  Fruits  All fresh, dried, or frozen fruit. Canned fruits that are in their natural juice and do not have sugar added to them.  Vegetables  Fresh or frozen vegetables that are raw, steamed, roasted, or grilled. Low-sodium or reduced-sodium tomato and vegetable juice. Low-sodium or reduced-sodium tomato sauce and tomato paste. Low-sodium or reduced-sodium canned vegetables.  Grains  Whole-grain or whole-wheat bread. Whole-grain or whole-wheat pasta. Brown rice. Oatmeal. Quinoa. Bulgur. Whole-grain and low-sodium cereals. Antoinette bread. Low-fat, low-sodium crackers. Whole-wheat flour tortillas.  Meats and other proteins  Skinless chicken or turkey. Ground chicken or turkey. Pork with fat trimmed off. Fish and seafood. Egg whites. Dried beans, peas, or lentils. Unsalted nuts, nut butters, and seeds. Unsalted canned beans. Lean  cuts of beef with fat trimmed off. Low-sodium, lean precooked or cured meat, such as sausages or meat loaves.  Dairy  Low-fat (1%) or fat-free (skim) milk. Reduced-fat, low-fat, or fat-free cheeses. Nonfat, low-sodium ricotta or cottage cheese. Low-fat or nonfat yogurt. Low-fat, low-sodium cheese.  Fats and oils  Soft margarine without trans fats. Vegetable oil. Reduced-fat, low-fat, or light mayonnaise and salad dressings (reduced-sodium). Canola, safflower, olive, avocado, soybean, and sunflower oils. Avocado.  Seasonings and condiments  Herbs. Spices. Seasoning mixes without salt.  Other foods  Unsalted popcorn and pretzels. Fat-free sweets.  The items listed above may not be all the foods and drinks you can have. Talk to a dietitian to learn more.  What foods should I avoid?  Fruits  Canned fruit in a light or heavy syrup. Fried fruit. Fruit in cream or butter sauce.  Vegetables  Creamed or fried vegetables. Vegetables in a cheese sauce. Regular canned vegetables that are not marked as low-sodium or reduced-sodium. Regular canned tomato sauce and paste that are not marked as low-sodium or reduced-sodium. Regular tomato and vegetable juices that are not marked as low-sodium or reduced-sodium. Pickles. Olives.  Grains  Baked goods made with fat, such as croissants, muffins, or some breads. Dry pasta or rice meal packs.  Meats and other proteins  Fatty cuts of meat. Ribs. Fried meat. Jennings. Bologna, salami, and other precooked or cured meats, such as sausages or meat loaves, that are not lean and low in sodium. Fat from the back of a pig (fatback). Bratwurst. Salted nuts and seeds. Canned beans with added salt. Canned or smoked fish. Whole eggs or egg yolks. Chicken or turkey with skin.  Dairy  Whole or 2% milk, cream, and half-and-half. Whole or full-fat cream cheese. Whole-fat or sweetened yogurt. Full-fat cheese. Nondairy creamers. Whipped toppings. Processed cheese and cheese spreads.  Fats and oils  Butter.  Stick margarine. Lard. Shortening. Ghee. Jennings fat. Tropical oils, such as coconut, palm kernel, or palm oil.  Seasonings and condiments  Onion salt, garlic salt, seasoned salt, table salt, and sea salt. Worcestershire sauce. Tartar sauce. Barbecue sauce. Teriyaki sauce. Soy sauce, including reduced-sodium soy sauce. Steak sauce. Canned and packaged gravies. Fish sauce. Oyster sauce. Cocktail sauce. Store-bought horseradish. Ketchup. Mustard. Meat flavorings and tenderizers. Bouillon cubes. Hot sauces. Pre-made or packaged marinades. Pre-made or packaged taco seasonings. Relishes. Regular salad dressings.  Other foods  Salted popcorn and pretzels.  The items listed above may not be all the foods and drinks you should avoid. Talk to a dietitian to learn more.  Where to find more information  National Heart, Lung, and Blood [place] (NHLBI): nhlbi.nih.gov  American Heart Association (AHA): heart.org  Academy of Nutrition and Dietetics: eatright.org  National Kidney Foundation (NKF): kidney.org  This information is not intended to replace advice given to you by your health care provider. Make sure you discuss any questions you have with your health care provider.  Document Revised: 01/04/2024 Document Reviewed: 01/04/2024  [employer] Patient Education © 2024 [employer]  Steps to Quit Smoking  Smoking tobacco is the leading cause of preventable death. It can affect almost every organ in the body. Smoking puts you and those around you at risk for developing many serious chronic diseases.  Quitting smoking can be very challenging. Do not get discouraged if you are not successful the first time. Some people need to make many attempts to quit before they achieve long-term success. Do your best to stick to your quit plan, and talk with your health care provider if you have any questions or concerns.  How do I get ready to quit?  When you decide to quit smoking, create a plan to help you succeed. Before you quit:  Pick a  date to quit. Set a date within the next 2 weeks to give you time to prepare.  Write down the reasons why you are quitting. Keep this list in places where you will see it often.  Tell your family, friends, and co-workers that you are quitting. Support from people you are close to can make quitting easier.  Talk with your health care provider about your options for quitting smoking.  Find out what treatment options are covered by your health insurance.  Identify people, places, things, and activities that make you want to smoke (triggers). Avoid them.  What first steps can I take to quit smoking?  Throw away all cigarettes at home, at work, and in your car.  Throw away smoking accessories, such as ashtrays and lighters.  Clean your car. Make sure to empty the ashtray.  Clean your home, including curtains and carpets.  What strategies can I use to quit smoking?  Talk with your health care provider about combining strategies, such as taking medicines while you are also receiving in-person counseling. Using these two strategies together makes you more likely to succeed in quitting than if you used either strategy on its own.  If you are pregnant or breastfeeding, talk with your health care provider about finding counseling or other support strategies to quit smoking. Do not take medicine to help you quit smoking unless your health care provider tells you to.  Quit right away  Quit smoking completely, instead of gradually reducing how much you smoke over a period of time. Stopping smoking right away may be more successful than gradually quitting.  Attend in-person counseling to help you build problem-solving skills. You are more likely to succeed in quitting if you attend counseling sessions regularly. Even short sessions of 10 minutes can be effective.  Take medicine  You may take medicines to help you quit smoking. Some medicines require a prescription. You can also purchase over-the-counter medicines. Medicines may  have nicotine in them to replace the nicotine in cigarettes. Medicines may:  Help to stop cravings.  Help to relieve withdrawal symptoms.  Your health care provider may recommend:  Nicotine patches, gum, or lozenges.  Nicotine inhalers or sprays.  Non-nicotine medicine that you take by mouth.  Find resources  Find resources and support systems that can help you quit smoking and remain smoke-free after you quit. These resources are most helpful when you use them often. They include:  Online chats with a counselor.  Telephone quitlines.  Printed self-help materials.  Support groups or group counseling.  Text messaging programs.  Mobile phone apps or applications. Use apps that can help you stick to your quit plan by providing reminders, tips, and encouragement. Examples of free services include Quit Guide from the CDC and smokefree.gov    What can I do to make it easier to quit?    Reach out to your family and friends for support and encouragement. Call telephone quitlines, such as 0-800-QUIT-NOW, reach out to support groups, or work with a counselor for support.  Ask people who smoke to avoid smoking around you.  Avoid places that trigger you to smoke, such as bars, parties, or smoke-break areas at work.  Spend time with people who do not smoke.  Lessen the stress in your life. Stress can be a smoking trigger for some people. To lessen stress, try:  Exercising regularly.  Doing deep-breathing exercises.  Doing yoga.  Meditating.  What benefits will I see if I quit smoking?  Over time, you should start to see positive results, such as:  Improved sense of smell and taste.  Decreased coughing and sore throat.  Slower heart rate.  Lower blood pressure.  Clearer and healthier skin.  The ability to breathe more easily.  Fewer sick days.  Summary  Quitting smoking can be very challenging. Do not get discouraged if you are not successful the first time. Some people need to make many attempts to quit before they achieve  long-term success.  When you decide to quit smoking, create a plan to help you succeed.  Quit smoking right away, not slowly over a period of time.  Find resources and support systems that can help you quit smoking and remain smoke-free after you quit.  This information is not intended to replace advice given to you by your health care provider. Make sure you discuss any questions you have with your health care provider.  Document Revised: 12/09/2022 Document Reviewed: 12/09/2022  [employer] Patient Education © 2024 [employer]  Tobacco Use Disorder  Tobacco use disorder (TUD) occurs when a person craves, seeks, and uses tobacco, regardless of the consequences. This disorder can cause problems with mental and physical health. It can affect your ability to have healthy relationships. It can also keep you from meeting your responsibilities at work, home, or school.  Tobacco products contain a dangerous chemical called nicotine. Nicotine triggers hormones that make the body feel stimulated and works on areas of the brain that make a person feel good. These effects can make the person depend on nicotine, which makes it hard to quit tobacco.  Tobacco may be:  Smoked as a cigarette or cigar.  Inhaled using vaping devices, such as e-cigarettes.  Smoked in a pipe or hookah.  Chewed as smokeless tobacco.  Inhaled into the nostrils as snuff.  What are the causes?  This condition is caused by using nicotine. Nicotine causes changes in your brain that make you want more and more. This is called addiction. This can make it hard to stop using tobacco once you start.  What are the signs or symptoms?  Symptoms of TUD may include:  Being unable to stop your tobacco use even after planned quit attempts.  Spending an abnormal amount of time getting or using tobacco.  Craving tobacco.  Tobacco use that:  Interferes with your work, school, or home life.  Interferes with your personal and social relationships.  Makes you give up  activities that you once enjoyed or found important.  Using tobacco even though you know that it is:  Dangerous or bad for your health or someone else's health.  Causing problems in your life.  Needing more and more of the substance to get the same effect (developing tolerance).  Using the substance to avoid unpleasant symptoms if you do not use the substance (withdrawal).  How is this diagnosed?  This condition may be diagnosed based on:  Your current and past tobacco use. Your health care provider may ask questions about how your tobacco use affects your life.  A physical exam.  You may be diagnosed with TUD if you have at least two symptoms within a 12-month period.  How is this treated?  This condition is treated by stopping tobacco use. Many people are unable to quit on their own and need help. Treatment may include:  Nicotine replacement therapy (NRT). NRT provides nicotine without the other harmful chemicals in tobacco. NRT gradually lowers the dosage of nicotine in the body and reduces withdrawal symptoms. NRT is available as:  Over-the-counter gums, lozenges, and skin patches.  Prescription mouth inhalers and nasal sprays.  Medicine that acts on the brain to reduce cravings and withdrawal symptoms.  A type of talk therapy that examines your triggers for tobacco use, how to avoid them, and how to cope with cravings (behavioral therapy).  Hypnosis. This may help with withdrawal symptoms.  Joining a support group for people coping with TUD.  The best treatment for TUD is usually a combination of medicine, talk therapy, and support groups. Recovery can be a long process. Many people start using tobacco again after stopping (relapse). If you relapse, it does not mean that treatment will not work.  Follow these instructions at home:    Lifestyle  Do not use any products that contain nicotine or tobacco. These products include cigarettes, chewing tobacco, and vaping devices, such as e-cigarettes. If you need help  quitting, ask your health care provider.  Avoid things that trigger tobacco use as much as you can. Triggers include people and situations that usually cause you to use tobacco.  Avoid drinks that contain caffeine, including coffee. These may worsen some withdrawal symptoms.  Find ways to manage stress. Wanting to smoke may cause stress, and stress can make you want to smoke. Relaxation techniques such as deep breathing, meditation, and yoga may help.  Attend support groups as needed. These groups are an important part of long-term recovery for many people.  General instructions  Take over-the-counter and prescription medicines only as told by your health care provider.  Check with your health care provider before taking any new prescription or over-the-counter medicines.  Decide on a friend, family member, or smoking quit-line (such as 1-800-QUIT-NOW in the [place] that you can call or text when you feel the urge to smoke or when you need help coping with cravings.  Keep all follow-up visits. This is important.  Contact a health care provider if:  You are not able to take your medicines as told by your health care provider.  Your symptoms get worse, even with treatment.  Summary  Tobacco use disorder (TUD) occurs when a person craves, seeks, and uses tobacco regardless of the consequences.  This condition may be diagnosed based on your current and past tobacco use and a physical exam.  Many people are unable to quit on their own and need help. Recovery can be a long process.  The most effective treatment for TUD is usually a combination of medicine, talk therapy, and support groups.  This information is not intended to replace advice given to you by your health care provider. Make sure you discuss any questions you have with your health care provider.  Document Revised: 12/13/2022 Document Reviewed: 12/13/2022  [employer] Patient Education © 2024 [employer]    Back Exercises  These exercises help to make your trunk  and back strong. They also help to keep the lower back flexible. Doing these exercises can help to prevent or lessen pain in your lower back.  If you have back pain, try to do these exercises 2-3 times each day or as told by your doctor.  As you get better, do the exercises once each day. Repeat the exercises more often as told by your doctor.  To stop back pain from coming back, do the exercises once each day, or as told by your doctor.  Do exercises exactly as told by your doctor. Stop right away if you feel sudden pain or your pain gets worse.  Exercises  Single knee to chest  Do these steps 3-5 times in a row for each leg:  Lie on your back on a firm bed or the floor with your legs stretched out.  Bring one knee to your chest.  Grab your knee or thigh with both hands and hold it in place.  Pull on your knee until you feel a gentle stretch in your lower back or butt.  Keep doing the stretch for 10-30 seconds.  Slowly let go of your leg and straighten it.  Pelvic tilt  Do these steps 5-10 times in a row:  Lie on your back on a firm bed or the floor with your legs stretched out.  Bend your knees so they point up to the ceiling. Your feet should be flat on the floor.  Tighten your lower belly (abdomen) muscles to press your lower back against the floor. This will make your tailbone point up to the ceiling instead of pointing down to your feet or the floor.  Stay in this position for 5-10 seconds while you gently tighten your muscles and breathe evenly.  Cat-cow  Do these steps until your lower back bends more easily:  Get on your hands and knees on a firm bed or the floor. Keep your hands under your shoulders, and keep your knees under your hips. You may put padding under your knees.  Let your head hang down toward your chest. Tighten (contract) the muscles in your belly. Point your tailbone toward the floor so your lower back becomes rounded like the back of a cat.  Stay in this position for 5 seconds.  Slowly lift  your head. Let the muscles of your belly relax. Point your tailbone up toward the ceiling so your back forms a sagging arch like the back of a cow.  Stay in this position for 5 seconds.    Press-ups  Do these steps 5-10 times in a row:  Lie on your belly (face-down) on a firm bed or the floor.  Place your hands near your head, about shoulder-width apart.  While you keep your back relaxed and keep your hips on the floor, slowly straighten your arms to raise the top half of your body and lift your shoulders. Do not use your back muscles. You may change where you place your hands to make yourself more comfortable.  Stay in this position for 5 seconds. Keep your back relaxed.  Slowly return to lying flat on the floor.    Bridges  Do these steps 10 times in a row:  Lie on your back on a firm bed or the floor.  Bend your knees so they point up to the ceiling. Your feet should be flat on the floor. Your arms should be flat at your sides, next to your body.  Tighten your butt muscles and lift your butt off the floor until your waist is almost as high as your knees. If you do not feel the muscles working in your butt and the back of your thighs, slide your feet 1-2 inches (2.5-5 cm) farther away from your butt.  Stay in this position for 3-5 seconds.  Slowly lower your butt to the floor, and let your butt muscles relax.  If this exercise is too easy, try doing it with your arms crossed over your chest.  Belly crunches  Do these steps 5-10 times in a row:  Lie on your back on a firm bed or the floor with your legs stretched out.  Bend your knees so they point up to the ceiling. Your feet should be flat on the floor.  Cross your arms over your chest.  Tip your chin a little bit toward your chest, but do not bend your neck.  Tighten your belly muscles and slowly raise your chest just enough to lift your shoulder blades a tiny bit off the floor. Avoid raising your body higher than that because it can put too much stress on your  lower back.  Slowly lower your chest and your head to the floor.  Back lifts  Do these steps 5-10 times in a row:  Lie on your belly (face-down) with your arms at your sides, and rest your forehead on the floor.  Tighten the muscles in your legs and your butt.  Slowly lift your chest off the floor while you keep your hips on the floor. Keep the back of your head in line with the curve in your back. Look at the floor while you do this.  Stay in this position for 3-5 seconds.  Slowly lower your chest and your face to the floor.  Contact a doctor if:  Your back pain gets a lot worse when you do an exercise.  Your back pain does not get better within 2 hours after you exercise.  If you have any of these problems, stop doing the exercises. Do not do them again unless your doctor says it is okay.  Get help right away if:  You have sudden, very bad back pain. If this happens, stop doing the exercises. Do not do them again unless your doctor says it is okay.  This information is not intended to replace advice given to you by your health care provider. Make sure you discuss any questions you have with your health care provider.  Document Revised: 03/02/2022 Document Reviewed: 03/02/2022  Elsevier Patient Education © 2024 [employer]

## 2024-05-23 NOTE — PROGRESS NOTES
SUBJECTIVE:  Patient ID: Catrachita Argueta is a 58 y.o. female is here today for follow-up.    Chief Complaint: Back pain  Chief Complaint   Patient presents with    Follow-up     Patient is here for follow up.  She was given an order for therapy but did not complete therapy b/c of her busy schedule.  She says her pain is only intermittent (when she does increased walking on vacation).  Imaging BHP       HPI  58-year-old female we are following for complaints of left paraspinal back pain in the ascending numbness and tingling in the left lower extremity.  She has a history of significant peripheral vascular disease but per the last vascular study they do not think there is any restenosis.  She has not done any physical therapy.  She has not had any injection treatments.  She says the discomfort is claudicant in nature but is not so severe that she is impaired by these complaints.    The following portions of the patient's history were reviewed and updated as appropriate: allergies, current medications, past family history, past medical history, past social history, past surgical history and problem list.    OBJECTIVE:    Review of Systems   Musculoskeletal:  Positive for back pain.   Neurological:  Positive for numbness.   All other systems reviewed and are negative.         Physical Exam  Eyes:      Extraocular Movements: EOM normal.      Pupils: Pupils are equal, round, and reactive to light.   Neurological:      Mental Status: She is oriented to person, place, and time.      Motor: Motor strength is normal.     Coordination: Finger-Nose-Finger Test normal.      Gait: Gait is intact.   Psychiatric:         Speech: Speech normal.         Neurologic Exam     Mental Status   Oriented to person, place, and time.   Attention: normal.   Speech: speech is normal   Level of consciousness: alert  Knowledge: good.     Cranial Nerves     CN II   Visual fields full to confrontation.     CN III, IV, VI   Pupils are equal, round, and  reactive to light.  Extraocular motions are normal.     CN V   Facial sensation intact.     CN VII   Facial expression full, symmetric.     CN VIII   CN VIII normal.     CN IX, X   CN IX normal.   CN X normal.     CN XI   CN XI normal.     CN XII   CN XII normal.     Motor Exam   Muscle bulk: normal  Overall muscle tone: normal  Right arm pronator drift: absent  Left arm pronator drift: absent    Strength   Strength 5/5 throughout.     Sensory Exam   Light touch normal.   Pinprick normal.     Gait, Coordination, and Reflexes     Gait  Gait: normal    Coordination   Finger to nose coordination: normal    Tremor   Resting tremor: absent  Intention tremor: absent  Action tremor: absent    Reflexes   Reflexes 2+ except as noted.       Independent Review of Radiographic Studies:       ASSESSMENT/PLAN:  MRI of the lumbar spine shows very very mild degenerative changes with no significant neuroforaminal compromise or compression.  She does not have neurogenic claudication.  If her vascular studies are normal then this is almost certainly a peripheral neuropathy.  We discussed this at length.  Should her symptoms worsen we be happy to see her again in the future.      1. Chronic left-sided low back pain with left-sided sciatica    2. Smoker    3. Overweight with body mass index (BMI) of 27 to 27.9 in adult        The patient's Body mass index is 27.96 kg/m².. BMI is above normal parameters. Recommendations include: educational material    Return if symptoms worsen or fail to improve.      Harjit Mae MD

## 2024-06-06 ENCOUNTER — OFFICE VISIT (OUTPATIENT)
Dept: INTERNAL MEDICINE | Facility: CLINIC | Age: 59
End: 2024-06-06
Payer: COMMERCIAL

## 2024-06-06 VITALS
BODY MASS INDEX: 27.94 KG/M2 | TEMPERATURE: 96.4 F | HEART RATE: 91 BPM | DIASTOLIC BLOOD PRESSURE: 80 MMHG | OXYGEN SATURATION: 98 % | HEIGHT: 61 IN | WEIGHT: 148 LBS | SYSTOLIC BLOOD PRESSURE: 128 MMHG

## 2024-06-06 DIAGNOSIS — E11.65 TYPE 2 DIABETES MELLITUS WITH HYPERGLYCEMIA, WITHOUT LONG-TERM CURRENT USE OF INSULIN: Primary | ICD-10-CM

## 2024-06-06 DIAGNOSIS — E78.2 MIXED HYPERLIPIDEMIA: ICD-10-CM

## 2024-06-06 DIAGNOSIS — L30.9 ECZEMA, UNSPECIFIED TYPE: ICD-10-CM

## 2024-06-06 DIAGNOSIS — I10 PRIMARY HYPERTENSION: ICD-10-CM

## 2024-06-06 PROBLEM — K25.3 ACUTE GASTRIC ULCER: Status: RESOLVED | Noted: 2022-11-29 | Resolved: 2024-06-06

## 2024-06-06 PROBLEM — R63.4 WEIGHT LOSS: Status: RESOLVED | Noted: 2023-01-25 | Resolved: 2024-06-06

## 2024-06-06 PROCEDURE — 99214 OFFICE O/P EST MOD 30 MIN: CPT | Performed by: FAMILY MEDICINE

## 2024-06-06 RX ORDER — CLOTRIMAZOLE AND BETAMETHASONE DIPROPIONATE 10; .64 MG/G; MG/G
1 CREAM TOPICAL 2 TIMES DAILY
Qty: 15 G | Refills: 0 | Status: SHIPPED | OUTPATIENT
Start: 2024-06-06

## 2024-06-06 NOTE — PROGRESS NOTES
Subjective     Chief Complaint   Patient presents with    Diabetes    Hypertension       Diabetes    Hypertension        Catrachita Argueta is a 58 y.o. female who presents for a routine visit at this time. Patient presents for follow up of diabetes.. Current symptoms include: hyperglycemia and nausea. Patient denies foot ulcerations and visual disturbances.  Home sugars: BGs are running  consistent with Hgb A1C. Current treatments:  Just started Mounjaro will follow to see if this is effective for this patient .  Patient is due for her diabetic labs at in 3 months and would like me to set these up for the future, so we will go ahead and order hemoglobin A1c, comprehensive metabolic panel, lipid profile, and microalbumin urine for evaluation.     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  His hypertension remains very well-controlled and hyperlipidemia continues to do well with current medications although triglycerides do remain elevated    Patient's PMR from outside medical facility reviewed and noted.      Past Medical History:   Past Medical History:   Diagnosis Date    Anxiety     Depression     Diabetes mellitus     GA (granuloma annulare)     GERD (gastroesophageal reflux disease)     Hypertension     Low back pain     PAD (peripheral artery disease)      Past Surgical History:  Past Surgical History:   Procedure Laterality Date    AORTAGRAM Left 2023    Procedure: LEFT LOWER EXTREMITY ANGIOGRAM, BALLOON ANGIOPLASTY MYNX CLOSURE;  Surgeon: Kei Jones DO;  Location:  [facility] HYBRID OR 12;  Service: Vascular;  Laterality: Left;    CARDIAC CATHETERIZATION      CARDIAC CATHETERIZATION N/A 3/22/2024    Procedure: Left Heart Cath;  Surgeon: Duncan Norton DO;  Location:  [facility] CATH INVASIVE LOCATION;  Service: Cardiovascular;  Laterality: N/A;     SECTION       COLONOSCOPY N/A 03/02/2023    Procedure: COLONOSCOPY WITH ANESTHESIA;  Surgeon: Mushtaq Crews MD;  Location: [facility] ENDOSCOPY;  Service: Gastroenterology;  Laterality: N/A;  preop; diarrhea  postop; polyps; diverticulosis   PCP Ana Hernandez     ILIAC ARTERY STENT Bilateral     IR ANGIOGRAM EXTREMITY UNILATERAL       Social History:  reports that she has been smoking cigarettes. She has a 30 pack-year smoking history. She has been exposed to tobacco smoke. She has never used smokeless tobacco. She reports that she does not drink alcohol and does not use drugs.    Family History: family history includes No Known Problems in her father and mother. She was adopted.      Allergies:  No Known Allergies  Medications:  Prior to Admission medications    Medication Sig Start Date End Date Taking? Authorizing Provider   amLODIPine (NORVASC) 10 MG tablet Take 1 tablet by mouth Daily. 3/13/24  Yes Duncan Norton,    aspirin 81 MG EC tablet Take 1 tablet by mouth Daily.   Yes Sanjuana Esposito MD   atorvastatin (LIPITOR) 40 MG tablet Take 1 tablet by mouth Daily. 7/17/23  Yes Ambrocio Gutierrez APRN   clopidogrel (PLAVIX) 75 MG tablet Take 1 tablet by mouth Daily. 2/27/24  Yes William Ellis MD   fenofibrate (TRICOR) 145 MG tablet Take 1 tablet by mouth Daily. 2/27/24  Yes William Ellis MD   fluticasone (FLONASE) 50 MCG/ACT nasal spray 1 spray into the nostril(s) as directed by provider Daily. 12/26/23  Yes William Ellis MD   glipizide (Glucotrol) 10 MG tablet Take 1 tablet by mouth 2 (Two) Times a Day Before Meals. 2/27/24  Yes William Ellis MD   lisinopril-hydrochlorothiazide (PRINZIDE,ZESTORETIC) 20-12.5 MG per tablet Take 1 tablet by mouth 2 (Two) Times a Day. 2/27/24  Yes William Ellis MD   metFORMIN (GLUCOPHAGE) 1000 MG tablet Take 1 tablet by mouth 2 (Two) Times a Day With Meals.   Yes ProviderSanjuana MD   metoprolol succinate XL  "(TOPROL-XL) 25 MG 24 hr tablet Take 1 tablet by mouth Daily. 3/13/24  Yes Duncan Norton DO   omeprazole (priLOSEC) 40 MG capsule Take 1 capsule by mouth Daily. 2/27/24  Yes William Ellis MD   Tirzepatide (MOUNJARO) 2.5 MG/0.5ML solution pen-injector pen Inject 0.5 mL under the skin into the appropriate area as directed 1 (One) Time Per Week. 5/23/24  Yes William Ellis MD   vitamin D (ERGOCALCIFEROL) 1.25 MG (22326 UT) capsule capsule Take 1 capsule by mouth 1 (One) Time Per Week. 2/27/24  Yes William Ellis MD           Review of systems   negative unless otherwise specified above in HPI    Objective     Vital Signs: /80 (BP Location: Left arm, Patient Position: Sitting, Cuff Size: Adult)   Pulse 91   Temp 96.4 °F (35.8 °C) (Infrared)   Ht 154.9 cm (61\")   Wt 67.1 kg (148 lb)   LMP  (LMP Unknown) Comment: 2015ish  SpO2 98%   BMI 27.96 kg/m²     Physical Exam  Vitals and nursing note reviewed.   Constitutional:       General: She is not in acute distress.     Appearance: Normal appearance.   HENT:      Head: Normocephalic.   Eyes:      Extraocular Movements: Extraocular movements intact.      Pupils: Pupils are equal, round, and reactive to light.   Cardiovascular:      Rate and Rhythm: Normal rate and regular rhythm.      Heart sounds: Normal heart sounds. No murmur heard.  Pulmonary:      Effort: Pulmonary effort is normal. No respiratory distress.      Breath sounds: Normal breath sounds. No rhonchi or rales.   Abdominal:      General: Abdomen is flat. Bowel sounds are normal.      Palpations: Abdomen is soft.   Neurological:      General: No focal deficit present.      Mental Status: She is alert.                  Results Reviewed:  Glucose   Date Value Ref Range Status   05/22/2024 355 (H) 70 - 99 mg/dL Final   03/22/2024 311 (H) 65 - 99 mg/dL Final     BUN   Date Value Ref Range Status   05/22/2024 15 6 - 24 mg/dL Final   03/22/2024 29 (H) 6 - 20 " mg/dL Final     Creatinine   Date Value Ref Range Status   05/22/2024 0.83 0.57 - 1.00 mg/dL Final   04/12/2024 0.80 0.60 - 1.30 mg/dL Final     Comment:     Serial Number: [id number]:  [id number]     Sodium   Date Value Ref Range Status   05/22/2024 133 (L) 134 - 144 mmol/L Final   03/22/2024 134 (L) 136 - 145 mmol/L Final     Potassium   Date Value Ref Range Status   05/22/2024 4.5 3.5 - 5.2 mmol/L Final   03/22/2024 4.3 3.5 - 5.2 mmol/L Final     Chloride   Date Value Ref Range Status   05/22/2024 96 96 - 106 mmol/L Final   03/22/2024 98 98 - 107 mmol/L Final     CO2   Date Value Ref Range Status   03/22/2024 24.0 22.0 - 29.0 mmol/L Final     Total CO2   Date Value Ref Range Status   05/22/2024 20 20 - 29 mmol/L Final     Calcium   Date Value Ref Range Status   05/22/2024 9.8 8.7 - 10.2 mg/dL Final   03/22/2024 10.0 8.6 - 10.5 mg/dL Final     ALT (SGPT)   Date Value Ref Range Status   05/22/2024 31 0 - 32 IU/L Final     AST (SGOT)   Date Value Ref Range Status   05/22/2024 21 0 - 40 IU/L Final     WBC   Date Value Ref Range Status   03/22/2024 8.65 3.40 - 10.80 10*3/mm3 Final   02/26/2024 8.8 3.4 - 10.8 x10E3/uL Final     Hematocrit   Date Value Ref Range Status   03/22/2024 39.3 34.0 - 46.6 % Final     Platelets   Date Value Ref Range Status   03/22/2024 370 140 - 450 10*3/mm3 Final     Triglycerides   Date Value Ref Range Status   02/26/2024 270 (H) 0 - 149 mg/dL Final     HDL Cholesterol   Date Value Ref Range Status   02/26/2024 31 (L) >39 mg/dL Final     LDL Chol Calc (NIH)   Date Value Ref Range Status   02/26/2024 74 0 - 99 mg/dL Final     Hemoglobin A1C   Date Value Ref Range Status   05/22/2024 10.8 (H) 4.8 - 5.6 % Final     Comment:              Prediabetes: 5.7 - 6.4           Diabetes: >6.4           Glycemic control for adults with diabetes: <7.0     07/19/2023 7.8 % Final                 Assessment / Plan     Assessment/Plan:   Diagnosis Plan   1. Type 2 diabetes mellitus with hyperglycemia, without  long-term current use of insulin  Lipid Panel    Comprehensive Metabolic Panel    MicroAlbumin, Urine, Random - Urine, Clean Catch    Hemoglobin A1c      2. Primary hypertension        3. Eczema, unspecified type  clotrimazole-betamethasone (LOTRISONE) 1-0.05 % cream      4. Mixed hyperlipidemia              Return in about 3 months (around 9/6/2024) for Recheck. unless patient needs to be seen sooner or acute issues arise.      I have discussed the patient results/orders and and plan/recommendation with them at today's visit.      Signed by:    William Ellis MD Date: 06/06/24

## 2024-06-12 ENCOUNTER — TELEPHONE (OUTPATIENT)
Dept: VASCULAR SURGERY | Facility: CLINIC | Age: 59
End: 2024-06-12
Payer: COMMERCIAL

## 2024-06-12 NOTE — TELEPHONE ENCOUNTER
PATIENT RETURNED CALL ABOUT R/S APPOINTMENT, STATED THAT SHE FEELS FINE AND HAS NO NEED TO R/S RIGHT NOW.

## 2024-07-01 ENCOUNTER — TELEPHONE (OUTPATIENT)
Dept: INTERNAL MEDICINE | Facility: CLINIC | Age: 59
End: 2024-07-01
Payer: COMMERCIAL

## 2024-07-01 DIAGNOSIS — E11.65 TYPE 2 DIABETES MELLITUS WITH HYPERGLYCEMIA, WITHOUT LONG-TERM CURRENT USE OF INSULIN: ICD-10-CM

## 2024-07-01 NOTE — TELEPHONE ENCOUNTER
She is concerned because her sugar was 54 and so she ate a banana, and brownie, which she knows the brownine wasn't the best but what she had at the time.  And then just a little bit ago it was 254.    She asked what is the best time to test after you eat? She wants to see what is the best time to get the most accurate  reading. She would like to talk with you about this,    she is worried because she is on 3 different meds for the diabeties

## 2024-07-24 DIAGNOSIS — E78.5 DYSLIPIDEMIA: ICD-10-CM

## 2024-07-24 RX ORDER — ATORVASTATIN CALCIUM 40 MG/1
40 TABLET, FILM COATED ORAL DAILY
Qty: 30 TABLET | Refills: 0 | Status: SHIPPED | OUTPATIENT
Start: 2024-07-24

## 2024-07-24 NOTE — TELEPHONE ENCOUNTER
Rx Refill Note  Requested Prescriptions     Pending Prescriptions Disp Refills    atorvastatin (LIPITOR) 40 MG tablet [Pharmacy Med Name: Atorvastatin Calcium 40 MG Oral Tablet] 30 tablet 0     Sig: Take 1 tablet by mouth once daily      Last office visit with prescribing clinician: 6/6/2024   Last telemedicine visit with prescribing clinician: Visit date not found   Next office visit with prescribing clinician: 9/5/2024                         Would you like a call back once the refill request has been completed: [] Yes [] No    If the office needs to give you a call back, can they leave a voicemail: [] Yes [] No    Tracy Gutierrez RN  07/24/24, 08:24 CDT

## 2024-08-27 ENCOUNTER — OFFICE VISIT (OUTPATIENT)
Dept: INTERNAL MEDICINE | Facility: CLINIC | Age: 59
End: 2024-08-27
Payer: COMMERCIAL

## 2024-08-27 VITALS
SYSTOLIC BLOOD PRESSURE: 112 MMHG | TEMPERATURE: 98 F | BODY MASS INDEX: 27.56 KG/M2 | HEIGHT: 61 IN | DIASTOLIC BLOOD PRESSURE: 68 MMHG | OXYGEN SATURATION: 95 % | WEIGHT: 146 LBS | HEART RATE: 72 BPM

## 2024-08-27 DIAGNOSIS — R42 DIZZY: ICD-10-CM

## 2024-08-27 DIAGNOSIS — L98.9 SKIN LESION: ICD-10-CM

## 2024-08-27 DIAGNOSIS — R53.82 CHRONIC FATIGUE: ICD-10-CM

## 2024-08-27 DIAGNOSIS — I10 PRIMARY HYPERTENSION: ICD-10-CM

## 2024-08-27 DIAGNOSIS — E11.65 TYPE 2 DIABETES MELLITUS WITH HYPERGLYCEMIA, WITHOUT LONG-TERM CURRENT USE OF INSULIN: Primary | ICD-10-CM

## 2024-08-27 PROCEDURE — 99214 OFFICE O/P EST MOD 30 MIN: CPT | Performed by: FAMILY MEDICINE

## 2024-08-27 NOTE — PROGRESS NOTES
Subjective     Chief Complaint   Patient presents with    Hypertension    Diabetes       History of Present Illness    Catrachita Argueta is a 59 y.o. female who presents for a routine visit at this time.   Patient presents for follow up of diabetes.. Current symptoms include: hypoglycemia . Patient denies hyperglycemia, increased appetite, and nausea.  Home sugars: symptomatic hypoglycemia occurs around lunch and in the am . Current treatments:  discontinued glipzide will slightly increase her Aylin and discussed cutting back on her metformin in the near future. Patient is due for her diabetic labs at this time, so we will go ahead and order Cmp, HBa1c, Lipid Profile, and Microalbumin urine for evaluation.     Patient also believes that her blood pressure medicines have been making her dizzy.  Patient states that she had been put placed on Norvasc and metoprolol by cardiology.  She states that she has not done well since she has been on these meds we will go ahead and discontinue at this time due to dizzy spells not 100% sure whether her dizzy spells were related to her hypoglycemia versus the blood pressure meds but she also complains of chronic fatigue with these medications.  Will have her recheck her blood pressures and report back to this office to make sure she is doing well off the medications.    Has a skin lesion in the middle of her back.  Has a slightly dimpled appearance and is hard slightly darker than the surrounding skin we will keep an eye on this for future removal        Past Medical History:   Past Medical History:   Diagnosis Date    Anxiety     Depression     Diabetes mellitus     GA (granuloma annulare)     GERD (gastroesophageal reflux disease)     Hypertension     Low back pain     PAD (peripheral artery disease)      Past Surgical History:  Past Surgical History:   Procedure Laterality Date    AORTAGRAM Left 09/22/2023    Procedure: LEFT LOWER EXTREMITY ANGIOGRAM, BALLOON ANGIOPLASTY  MYNX CLOSURE;  Surgeon: Kei Jones DO;  Location: [facility] HYBRID OR 12;  Service: Vascular;  Laterality: Left;    CARDIAC CATHETERIZATION      CARDIAC CATHETERIZATION N/A 3/22/2024    Procedure: Left Heart Cath;  Surgeon: Duncan Norton DO;  Location: [facility] CATH INVASIVE LOCATION;  Service: Cardiovascular;  Laterality: N/A;     SECTION      COLONOSCOPY N/A 2023    Procedure: COLONOSCOPY WITH ANESTHESIA;  Surgeon: Mushtaq Crews MD;  Location: [facility] ENDOSCOPY;  Service: Gastroenterology;  Laterality: N/A;  preop; diarrhea  postop; polyps; diverticulosis   PCP Ana Hernandez     ILIAC ARTERY STENT Bilateral     IR ANGIOGRAM EXTREMITY UNILATERAL       Social History:  reports that she has been smoking cigarettes. She has a 30 pack-year smoking history. She has been exposed to tobacco smoke. She has never used smokeless tobacco. She reports that she does not drink alcohol and does not use drugs.    Family History: family history includes No Known Problems in her father and mother. She was adopted.      Allergies:  No Known Allergies  Medications:  Prior to Admission medications    Medication Sig Start Date End Date Taking? Authorizing Provider   amLODIPine (NORVASC) 10 MG tablet Take 1 tablet by mouth Daily. 3/13/24  Yes Duncan Norton DO   aspirin 81 MG EC tablet Take 1 tablet by mouth Daily.   Yes Provider, MD Sanjuana   atorvastatin (LIPITOR) 40 MG tablet Take 1 tablet by mouth Daily. 23  Yes Ambrocio Gutierrez APRN   clopidogrel (PLAVIX) 75 MG tablet Take 1 tablet by mouth Daily. 24  Yes William Ellis MD   fenofibrate (TRICOR) 145 MG tablet Take 1 tablet by mouth Daily. 24  Yes William Ellis MD   fluticasone (FLONASE) 50 MCG/ACT nasal spray 1 spray into the nostril(s) as directed by provider Daily. 23  Yes William Ellis MD   glipizide (Glucotrol) 10 MG tablet Take 1 tablet by mouth 2 (Two) Times a Day Before  "Meals. 2/27/24  Yes William Ellis MD   lisinopril-hydrochlorothiazide (PRINZIDE,ZESTORETIC) 20-12.5 MG per tablet Take 1 tablet by mouth 2 (Two) Times a Day. 2/27/24  Yes William Ellis MD   metFORMIN (GLUCOPHAGE) 1000 MG tablet Take 1 tablet by mouth 2 (Two) Times a Day With Meals.   Yes Sanjuana Esposito MD   metoprolol succinate XL (TOPROL-XL) 25 MG 24 hr tablet Take 1 tablet by mouth Daily. 3/13/24  Yes Duncan Norton DO   omeprazole (priLOSEC) 40 MG capsule Take 1 capsule by mouth Daily. 2/27/24  Yes William Ellis MD   Tirzepatide (MOUNJARO) 2.5 MG/0.5ML solution pen-injector pen Inject 0.5 mL under the skin into the appropriate area as directed 1 (One) Time Per Week. 5/23/24  Yes William Ellis MD   vitamin D (ERGOCALCIFEROL) 1.25 MG (89443 UT) capsule capsule Take 1 capsule by mouth 1 (One) Time Per Week. 2/27/24  Yes William Ellis MD           Review of systems   negative unless otherwise specified above in HPI    Objective     Vital Signs: /68 (BP Location: Left arm, Patient Position: Sitting, Cuff Size: Adult)   Pulse 72   Temp 98 °F (36.7 °C) (Infrared)   Ht 154.9 cm (61\")   Wt 66.2 kg (146 lb)   LMP  (LMP Unknown) Comment: 2015ish  SpO2 95%   BMI 27.59 kg/m²     Physical Exam  Vitals and nursing note reviewed.   Constitutional:       General: She is not in acute distress.     Appearance: Normal appearance.   HENT:      Head: Normocephalic.   Eyes:      Extraocular Movements: Extraocular movements intact.      Pupils: Pupils are equal, round, and reactive to light.   Cardiovascular:      Rate and Rhythm: Normal rate and regular rhythm.      Heart sounds: Normal heart sounds. No murmur heard.  Pulmonary:      Effort: Pulmonary effort is normal. No respiratory distress.      Breath sounds: Normal breath sounds. No rhonchi or rales.   Abdominal:      General: Abdomen is flat. Bowel sounds are normal.      Palpations: " Abdomen is soft.   Neurological:      General: No focal deficit present.      Mental Status: She is alert.                  Results Reviewed:  Glucose   Date Value Ref Range Status   05/22/2024 355 (H) 70 - 99 mg/dL Final   03/22/2024 311 (H) 65 - 99 mg/dL Final     BUN   Date Value Ref Range Status   05/22/2024 15 6 - 24 mg/dL Final   03/22/2024 29 (H) 6 - 20 mg/dL Final     Creatinine   Date Value Ref Range Status   05/22/2024 0.83 0.57 - 1.00 mg/dL Final   04/12/2024 0.80 0.60 - 1.30 mg/dL Final     Comment:     Serial Number: [id number]:  [id number]     Sodium   Date Value Ref Range Status   05/22/2024 133 (L) 134 - 144 mmol/L Final   03/22/2024 134 (L) 136 - 145 mmol/L Final     Potassium   Date Value Ref Range Status   05/22/2024 4.5 3.5 - 5.2 mmol/L Final   03/22/2024 4.3 3.5 - 5.2 mmol/L Final     Chloride   Date Value Ref Range Status   05/22/2024 96 96 - 106 mmol/L Final   03/22/2024 98 98 - 107 mmol/L Final     CO2   Date Value Ref Range Status   03/22/2024 24.0 22.0 - 29.0 mmol/L Final     Total CO2   Date Value Ref Range Status   05/22/2024 20 20 - 29 mmol/L Final     Calcium   Date Value Ref Range Status   05/22/2024 9.8 8.7 - 10.2 mg/dL Final   03/22/2024 10.0 8.6 - 10.5 mg/dL Final     ALT (SGPT)   Date Value Ref Range Status   05/22/2024 31 0 - 32 IU/L Final     AST (SGOT)   Date Value Ref Range Status   05/22/2024 21 0 - 40 IU/L Final     WBC   Date Value Ref Range Status   03/22/2024 8.65 3.40 - 10.80 10*3/mm3 Final   02/26/2024 8.8 3.4 - 10.8 x10E3/uL Final     Hematocrit   Date Value Ref Range Status   03/22/2024 39.3 34.0 - 46.6 % Final     Platelets   Date Value Ref Range Status   03/22/2024 370 140 - 450 10*3/mm3 Final     Triglycerides   Date Value Ref Range Status   02/26/2024 270 (H) 0 - 149 mg/dL Final     HDL Cholesterol   Date Value Ref Range Status   02/26/2024 31 (L) >39 mg/dL Final     LDL Chol Calc (NIH)   Date Value Ref Range Status   02/26/2024 74 0 - 99 mg/dL Final     Hemoglobin  A1C   Date Value Ref Range Status   05/22/2024 10.8 (H) 4.8 - 5.6 % Final     Comment:              Prediabetes: 5.7 - 6.4           Diabetes: >6.4           Glycemic control for adults with diabetes: <7.0     07/19/2023 7.8 % Final                 Assessment / Plan     Assessment/Plan:   Diagnosis Plan   1. Type 2 diabetes mellitus with hyperglycemia, without long-term current use of insulin  Tirzepatide (MOUNJARO) 7.5 MG/0.5ML solution pen-injector pen      2. Chronic fatigue        3. Primary hypertension        4. Dizzy        5. Skin lesion                Return in about 3 months (around 11/27/2024) for Recheck. unless patient needs to be seen sooner or acute issues arise.      I have discussed the patient results/orders and and plan/recommendation with them at today's visit.      Signed by:    William Ellis MD Date: 08/27/24

## 2024-08-28 ENCOUNTER — OFFICE VISIT (OUTPATIENT)
Dept: CARDIOLOGY | Facility: CLINIC | Age: 59
End: 2024-08-28
Payer: COMMERCIAL

## 2024-08-28 VITALS
DIASTOLIC BLOOD PRESSURE: 62 MMHG | WEIGHT: 146 LBS | HEIGHT: 61 IN | HEART RATE: 119 BPM | BODY MASS INDEX: 27.56 KG/M2 | SYSTOLIC BLOOD PRESSURE: 120 MMHG | OXYGEN SATURATION: 98 %

## 2024-08-28 DIAGNOSIS — E66.3 OVERWEIGHT WITH BODY MASS INDEX (BMI) OF 29 TO 29.9 IN ADULT: ICD-10-CM

## 2024-08-28 DIAGNOSIS — I10 PRIMARY HYPERTENSION: Primary | ICD-10-CM

## 2024-08-28 DIAGNOSIS — F17.210 CIGARETTE NICOTINE DEPENDENCE WITHOUT COMPLICATION: ICD-10-CM

## 2024-08-28 DIAGNOSIS — I25.10 MILD CAD: ICD-10-CM

## 2024-08-28 DIAGNOSIS — E78.2 MIXED HYPERLIPIDEMIA: ICD-10-CM

## 2024-08-28 DIAGNOSIS — E11.8 TYPE 2 DIABETES MELLITUS WITH COMPLICATION, WITHOUT LONG-TERM CURRENT USE OF INSULIN: ICD-10-CM

## 2024-08-28 PROCEDURE — 99214 OFFICE O/P EST MOD 30 MIN: CPT | Performed by: EMERGENCY MEDICINE

## 2024-09-01 PROCEDURE — 93000 ELECTROCARDIOGRAM COMPLETE: CPT | Performed by: EMERGENCY MEDICINE

## 2024-09-02 NOTE — PROGRESS NOTES
Subjective:     Encounter Date:08/28/2024      Patient ID: Catrachita Argueta is a 59 y.o. female.    Chief Complaint:  History of Present Illness  History of Present Illness  The patient presents for evaluation of chest pain.    She expresses concern about her heart health, given her 's history of heart failure, and is particularly worried about the chest pain and heaviness she experiences. However, she has not had any chest pain in the last few days.    She reports overall good health but has been experiencing severe bouts of sleepiness during the day over the past few months. She suspects this may be due to the addition of two new blood pressure medications, metoprolol and amlodipine, which she takes at night. This daytime sleepiness has been disruptive to her work, leading her to discontinue these medications a few days ago. However, it's too soon to determine if this has had any impact.     Since starting these medications, she has not experienced any hot flashes. Her primary care physician advised her to stop taking amlodipine and to consult with me regarding this decision. He also suggested keeping metoprolol on hand in case it needs to be reintroduced.    She is currently taking aspirin, Plavix, and Lipitor.        Review of Systems   Constitutional: Positive for malaise/fatigue. Negative for diaphoresis and fever.   HENT:  Negative for congestion.    Eyes:  Negative for vision loss in left eye and vision loss in right eye.   Cardiovascular:  Negative for chest pain, claudication, dyspnea on exertion, irregular heartbeat, leg swelling, orthopnea, palpitations and syncope.   Respiratory:  Negative for cough, shortness of breath and wheezing.    Hematologic/Lymphatic: Negative for adenopathy.   Skin:  Negative for rash.   Musculoskeletal:  Negative for joint pain and joint swelling.   Gastrointestinal:  Negative for abdominal pain, diarrhea, nausea and vomiting.   Neurological:  Negative for excessive  daytime sleepiness, dizziness, focal weakness, light-headedness, numbness and weakness.   Psychiatric/Behavioral:  Negative for depression. The patient does not have insomnia.            Current Outpatient Medications:     aspirin 81 MG EC tablet, Take 1 tablet by mouth Daily., Disp: , Rfl:     atorvastatin (LIPITOR) 40 MG tablet, Take 1 tablet by mouth once daily, Disp: 30 tablet, Rfl: 0    clopidogrel (PLAVIX) 75 MG tablet, Take 1 tablet by mouth Daily., Disp: 90 tablet, Rfl: 3    clotrimazole-betamethasone (LOTRISONE) 1-0.05 % cream, Apply 1 Application topically to the appropriate area as directed 2 (Two) Times a Day. Apply to affected area bid, Disp: 15 g, Rfl: 0    fenofibrate (TRICOR) 145 MG tablet, Take 1 tablet by mouth Daily., Disp: 90 tablet, Rfl: 3    fluticasone (FLONASE) 50 MCG/ACT nasal spray, 1 spray into the nostril(s) as directed by provider Daily., Disp: 16 g, Rfl: 11    lisinopril-hydrochlorothiazide (PRINZIDE,ZESTORETIC) 20-12.5 MG per tablet, Take 1 tablet by mouth 2 (Two) Times a Day., Disp: 180 tablet, Rfl: 3    metFORMIN (GLUCOPHAGE) 1000 MG tablet, Take 1 tablet by mouth 2 (Two) Times a Day With Meals., Disp: , Rfl:     omeprazole (priLOSEC) 40 MG capsule, Take 1 capsule by mouth Daily., Disp: 90 capsule, Rfl: 3    Tirzepatide (MOUNJARO) 7.5 MG/0.5ML solution pen-injector pen, Inject 0.5 mL under the skin into the appropriate area as directed 1 (One) Time Per Week., Disp: 2 mL, Rfl: 3    vitamin D (ERGOCALCIFEROL) 1.25 MG (45848 UT) capsule capsule, Take 1 capsule by mouth 1 (One) Time Per Week., Disp: 5 capsule, Rfl: 3       Objective:      Vitals:    08/28/24 0848   BP: 120/62   Pulse: 119   SpO2: 98%     Vitals and nursing note reviewed.   Constitutional:       Appearance: Normal and healthy appearance. Well-developed and not in distress.   Eyes:      Extraocular Movements: Extraocular movements intact.      Pupils: Pupils are equal, round, and reactive to light.   MAREN:      Head:  Normocephalic and atraumatic.    Mouth/Throat:      Pharynx: Oropharynx is clear.   Neck:      Vascular: JVD normal.      Trachea: Trachea normal.   Pulmonary:      Effort: Pulmonary effort is normal.      Breath sounds: Normal breath sounds. No wheezing. No rhonchi. No rales.   Cardiovascular:      PMI at left midclavicular line. Normal rate. Regular rhythm. Normal S1. Normal S2.       Murmurs: There is no murmur.      No gallop.  No click. No rub.   Pulses:     Dorsalis pedis: 2+ bilaterally.     Posterior tibial: 2+ bilaterally.  Abdominal:      General: Bowel sounds are normal.      Palpations: Abdomen is soft.      Tenderness: There is no abdominal tenderness.   Musculoskeletal: Normal range of motion.      Cervical back: Normal range of motion and neck supple. Skin:     General: Skin is warm and dry.      Capillary Refill: Capillary refill takes less than 2 seconds.   Feet:      Right foot:      Skin integrity: Skin integrity normal.      Left foot:      Skin integrity: Skin integrity normal.   Neurological:      Mental Status: Alert and oriented to person, place and time.      Sensory: Sensation is intact.      Motor: Motor function is intact.      Coordination: Coordination is intact.   Psychiatric:         Speech: Speech normal.         Behavior: Behavior is cooperative.       Physical Exam      Lab Review:         ECG 12 Lead    Date/Time: 9/1/2024 8:05 PM  Performed by: Duncan Norton DO    Authorized by: Duncan Norton DO  Comparison: compared with previous ECG   Similar to previous ECG  Rhythm: sinus rhythm  Rate: normal  Conduction: conduction normal  QRS axis: normal  Other findings: non-specific ST-T wave changes    Clinical impression: abnormal EKG        Results  Imaging  Ultrasound shows heart's ejection fraction is 61-65 and all valves are functioning properly. No segments of the heart are weak.    Assessment/Plan:     Problem List Items Addressed This Visit        Hypertension - Primary    Cigarette nicotine dependence without complication    Mild CAD    Mixed hyperlipidemia    Type 2 diabetes mellitus with complication, without long-term current use of insulin    Overweight with body mass index (BMI) of 29 to 29.9 in adult     Assessment & Plan  1. Chest pain.  The metoprolol medication could be contributing to his fatigue. Cardiac catheterization results were discussed, revealing a blockage in the right coronary artery, which is not severe at this time. The ultrasound showed normal heart function with an ejection fraction of 61 to 65 percent. All valves are functioning properly, and there are no signs of myocardial infarction or weakened heart segments. The presence of atherosclerotic plaque was also noted. He was advised to discontinue metoprolol and amlodipine due to their potential side effects. He was reassured that his current medications, aspirin, Plavix, and Lipitor, should help stabilize the plaque and prevent further progression. He was instructed to contact the office if he experiences recurrent chest pain or increased shortness of breath.    Follow-up  The patient will follow up in 6 months.      Recommendations/plans:    Transcribed from ambient dictation for Duncan Norton DO by Duncan Norton DO.  09/01/24   20:04 CDT    Patient or patient representative verbalized consent for the use of Ambient Listening during the visit with  Duncan Norton DO for chart documentation. 9/1/2024  20:06 CDT

## 2024-10-03 DIAGNOSIS — E78.5 DYSLIPIDEMIA: ICD-10-CM

## 2024-10-03 RX ORDER — ATORVASTATIN CALCIUM 40 MG/1
40 TABLET, FILM COATED ORAL DAILY
Qty: 30 TABLET | Refills: 0 | Status: SHIPPED | OUTPATIENT
Start: 2024-10-03

## 2024-10-14 ENCOUNTER — OFFICE VISIT (OUTPATIENT)
Dept: INTERNAL MEDICINE | Facility: CLINIC | Age: 59
End: 2024-10-14
Payer: COMMERCIAL

## 2024-10-14 VITALS
HEART RATE: 92 BPM | SYSTOLIC BLOOD PRESSURE: 113 MMHG | BODY MASS INDEX: 27.75 KG/M2 | TEMPERATURE: 97 F | WEIGHT: 147 LBS | DIASTOLIC BLOOD PRESSURE: 82 MMHG | HEIGHT: 61 IN

## 2024-10-14 DIAGNOSIS — M25.511 ACUTE PAIN OF RIGHT SHOULDER: Primary | ICD-10-CM

## 2024-10-14 DIAGNOSIS — E11.8 TYPE 2 DIABETES MELLITUS WITH COMPLICATION, WITHOUT LONG-TERM CURRENT USE OF INSULIN: ICD-10-CM

## 2024-10-14 PROCEDURE — 99214 OFFICE O/P EST MOD 30 MIN: CPT | Performed by: FAMILY MEDICINE

## 2024-10-14 NOTE — PROGRESS NOTES
Subjective     Chief Complaint   Patient presents with    Shoulder Pain       History of Present Illness    Catrachita Argueta is a 59 y.o. female who presents for a routine visit at this time.   Patient presents for follow up of diabetes.. Current symptoms include: hypoglycemia . Patient denies hyperglycemia, increased appetite, and nausea.  Home sugars: symptomatic hypoglycemia occurs around lunch and in the am . Current treatments:  discontinued glipzide will slightly increase her Aylin and discussed cutting back on her metformin in the near future. Patient is due for her diabetic labs at this time, so we will go ahead and order Cmp, HBa1c, Lipid Profile, and Microalbumin urine for evaluation.     Patient also comes in for right shoulder pain she states this began approximately 2 weeks ago she does have a positive positive empty can test but her symptoms are more consistent with some generalized osteoarthritis not ruling out rotator cuff pathology at this time we will go ahead and get some imaging to evaluate this patient was offered a joint injection or steroid shot but does not wish to proceed this with this at this time does have normal range of nor motion in that right shoulder    Past Medical History:   Past Medical History:   Diagnosis Date    Anxiety     Depression     Diabetes mellitus     GA (granuloma annulare)     GERD (gastroesophageal reflux disease)     Hypertension     Low back pain     PAD (peripheral artery disease)      Past Surgical History:  Past Surgical History:   Procedure Laterality Date    AORTAGRAM Left 09/22/2023    Procedure: LEFT LOWER EXTREMITY ANGIOGRAM, BALLOON ANGIOPLASTY MYNX CLOSURE;  Surgeon: Kei Jones DO;  Location: [facility] OR 12;  Service: Vascular;  Laterality: Left;    CARDIAC CATHETERIZATION      CARDIAC CATHETERIZATION N/A 3/22/2024    Procedure: Left Heart Cath;  Surgeon: Duncan Norton DO;  Location: [facility] CATH INVASIVE LOCATION;   Service: Cardiovascular;  Laterality: N/A;     SECTION      COLONOSCOPY N/A 2023    Procedure: COLONOSCOPY WITH ANESTHESIA;  Surgeon: Mushtaq Crews MD;  Location: [facility] ENDOSCOPY;  Service: Gastroenterology;  Laterality: N/A;  preop; diarrhea  postop; polyps; diverticulosis   PCP Ana Hernandez     ILIAC ARTERY STENT Bilateral     IR ANGIOGRAM EXTREMITY UNILATERAL       Social History:  reports that she has been smoking cigarettes. She has a 30 pack-year smoking history. She has been exposed to tobacco smoke. She has never used smokeless tobacco. She reports that she does not drink alcohol and does not use drugs.    Family History: family history includes No Known Problems in her father and mother. She was adopted.      Allergies:  No Known Allergies  Medications:  Prior to Admission medications    Medication Sig Start Date End Date Taking? Authorizing Provider   amLODIPine (NORVASC) 10 MG tablet Take 1 tablet by mouth Daily. 3/13/24  Yes Duncan Norton,    aspirin 81 MG EC tablet Take 1 tablet by mouth Daily.   Yes Provider, MD Sanjuana   atorvastatin (LIPITOR) 40 MG tablet Take 1 tablet by mouth Daily. 23  Yes Ambrocio Gutierrez APRN   clopidogrel (PLAVIX) 75 MG tablet Take 1 tablet by mouth Daily. 24  Yes William Ellis MD   fenofibrate (TRICOR) 145 MG tablet Take 1 tablet by mouth Daily. 24  Yes William Ellis MD   fluticasone (FLONASE) 50 MCG/ACT nasal spray 1 spray into the nostril(s) as directed by provider Daily. 23  Yes William Ellis MD   glipizide (Glucotrol) 10 MG tablet Take 1 tablet by mouth 2 (Two) Times a Day Before Meals. 24  Yes William Ellis MD   lisinopril-hydrochlorothiazide (PRINZIDE,ZESTORETIC) 20-12.5 MG per tablet Take 1 tablet by mouth 2 (Two) Times a Day. 24  Yes William Ellis MD   metFORMIN (GLUCOPHAGE) 1000 MG tablet Take 1 tablet by mouth 2 (Two) Times a Day With Meals.  "  Yes Provider, MD Sanjuana   metoprolol succinate XL (TOPROL-XL) 25 MG 24 hr tablet Take 1 tablet by mouth Daily. 3/13/24  Yes Duncan Norton DO   omeprazole (priLOSEC) 40 MG capsule Take 1 capsule by mouth Daily. 2/27/24  Yes William Ellis MD   Tirzepatide (MOUNJARO) 2.5 MG/0.5ML solution pen-injector pen Inject 0.5 mL under the skin into the appropriate area as directed 1 (One) Time Per Week. 5/23/24  Yes William Ellis MD   vitamin D (ERGOCALCIFEROL) 1.25 MG (50243 UT) capsule capsule Take 1 capsule by mouth 1 (One) Time Per Week. 2/27/24  Yes William Ellis MD           Review of systems   negative unless otherwise specified above in HPI    Objective     Vital Signs: /82 (BP Location: Right arm, Patient Position: Sitting, Cuff Size: Adult)   Pulse 92   Temp 97 °F (36.1 °C) (Infrared)   Ht 154.9 cm (61\")   Wt 66.7 kg (147 lb)   LMP  (LMP Unknown) Comment: 2015ish  BMI 27.78 kg/m²     Physical Exam  Vitals and nursing note reviewed.   Constitutional:       General: She is not in acute distress.     Appearance: Normal appearance.   HENT:      Head: Normocephalic.   Eyes:      Extraocular Movements: Extraocular movements intact.      Pupils: Pupils are equal, round, and reactive to light.   Cardiovascular:      Rate and Rhythm: Normal rate and regular rhythm.      Heart sounds: Normal heart sounds. No murmur heard.  Pulmonary:      Effort: Pulmonary effort is normal. No respiratory distress.      Breath sounds: Normal breath sounds. No rhonchi or rales.   Abdominal:      General: Abdomen is flat. Bowel sounds are normal.      Palpations: Abdomen is soft.   Neurological:      General: No focal deficit present.      Mental Status: She is alert.                  Results Reviewed:  Glucose   Date Value Ref Range Status   05/22/2024 355 (H) 70 - 99 mg/dL Final   03/22/2024 311 (H) 65 - 99 mg/dL Final     BUN   Date Value Ref Range Status   05/22/2024 15 6 - 24 " mg/dL Final   03/22/2024 29 (H) 6 - 20 mg/dL Final     Creatinine   Date Value Ref Range Status   05/22/2024 0.83 0.57 - 1.00 mg/dL Final   04/12/2024 0.80 0.60 - 1.30 mg/dL Final     Comment:     Serial Number: [id number]:  [id number]     Sodium   Date Value Ref Range Status   05/22/2024 133 (L) 134 - 144 mmol/L Final   03/22/2024 134 (L) 136 - 145 mmol/L Final     Potassium   Date Value Ref Range Status   05/22/2024 4.5 3.5 - 5.2 mmol/L Final   03/22/2024 4.3 3.5 - 5.2 mmol/L Final     Chloride   Date Value Ref Range Status   05/22/2024 96 96 - 106 mmol/L Final   03/22/2024 98 98 - 107 mmol/L Final     CO2   Date Value Ref Range Status   03/22/2024 24.0 22.0 - 29.0 mmol/L Final     Total CO2   Date Value Ref Range Status   05/22/2024 20 20 - 29 mmol/L Final     Calcium   Date Value Ref Range Status   05/22/2024 9.8 8.7 - 10.2 mg/dL Final   03/22/2024 10.0 8.6 - 10.5 mg/dL Final     ALT (SGPT)   Date Value Ref Range Status   05/22/2024 31 0 - 32 IU/L Final     AST (SGOT)   Date Value Ref Range Status   05/22/2024 21 0 - 40 IU/L Final     WBC   Date Value Ref Range Status   03/22/2024 8.65 3.40 - 10.80 10*3/mm3 Final   02/26/2024 8.8 3.4 - 10.8 x10E3/uL Final     Hematocrit   Date Value Ref Range Status   03/22/2024 39.3 34.0 - 46.6 % Final     Platelets   Date Value Ref Range Status   03/22/2024 370 140 - 450 10*3/mm3 Final     Triglycerides   Date Value Ref Range Status   02/26/2024 270 (H) 0 - 149 mg/dL Final     HDL Cholesterol   Date Value Ref Range Status   02/26/2024 31 (L) >39 mg/dL Final     LDL Chol Calc (NIH)   Date Value Ref Range Status   02/26/2024 74 0 - 99 mg/dL Final     Hemoglobin A1C   Date Value Ref Range Status   05/22/2024 10.8 (H) 4.8 - 5.6 % Final     Comment:              Prediabetes: 5.7 - 6.4           Diabetes: >6.4           Glycemic control for adults with diabetes: <7.0     07/19/2023 7.8 % Final       The following data was reviewed by: William Ellis MD on  10/14/2024:    Data reviewed : Radiologic studies right shoulder x-ray            Assessment / Plan     Assessment/Plan:   Diagnosis Plan   1. Acute pain of right shoulder  XR Shoulder 2+ View Right (In Office)      2. Type 2 diabetes mellitus with complication, without long-term current use of insulin  Microalbumin / Creatinine Urine Ratio - Urine, Clean Catch    Lipid Panel    Comprehensive Metabolic Panel    Hemoglobin A1c                Return in about 3 months (around 1/14/2025) for Recheck. unless patient needs to be seen sooner or acute issues arise.      I have discussed the patient results/orders and and plan/recommendation with them at today's visit.      Signed by:    William Ellis MD Date: 10/14/24

## 2024-10-15 LAB
ALBUMIN SERPL-MCNC: 4.4 G/DL (ref 3.8–4.9)
ALBUMIN/CREAT UR: <21 MG/G CREAT (ref 0–29)
ALP SERPL-CCNC: 47 IU/L (ref 44–121)
ALT SERPL-CCNC: 34 IU/L (ref 0–32)
AST SERPL-CCNC: 19 IU/L (ref 0–40)
BILIRUB SERPL-MCNC: 0.2 MG/DL (ref 0–1.2)
BUN SERPL-MCNC: 11 MG/DL (ref 6–24)
BUN/CREAT SERPL: 14 (ref 9–23)
CALCIUM SERPL-MCNC: 10.6 MG/DL (ref 8.7–10.2)
CHLORIDE SERPL-SCNC: 100 MMOL/L (ref 96–106)
CHOLEST SERPL-MCNC: 181 MG/DL (ref 100–199)
CO2 SERPL-SCNC: 20 MMOL/L (ref 20–29)
CREAT SERPL-MCNC: 0.78 MG/DL (ref 0.57–1)
CREAT UR-MCNC: 14.4 MG/DL
EGFRCR SERPLBLD CKD-EPI 2021: 88 ML/MIN/1.73
GLOBULIN SER CALC-MCNC: 2.7 G/DL (ref 1.5–4.5)
GLUCOSE SERPL-MCNC: 168 MG/DL (ref 70–99)
HBA1C MFR BLD: 8.1 % (ref 4.8–5.6)
HDLC SERPL-MCNC: 35 MG/DL
LDLC SERPL CALC-MCNC: 91 MG/DL (ref 0–99)
MICROALBUMIN UR-MCNC: <3 UG/ML
POTASSIUM SERPL-SCNC: 4.4 MMOL/L (ref 3.5–5.2)
PROT SERPL-MCNC: 7.1 G/DL (ref 6–8.5)
SODIUM SERPL-SCNC: 136 MMOL/L (ref 134–144)
TRIGL SERPL-MCNC: 331 MG/DL (ref 0–149)
VLDLC SERPL CALC-MCNC: 55 MG/DL (ref 5–40)

## 2024-10-28 DIAGNOSIS — M25.511 ACUTE PAIN OF RIGHT SHOULDER: Primary | ICD-10-CM

## 2024-11-04 DIAGNOSIS — E78.5 DYSLIPIDEMIA: ICD-10-CM

## 2024-11-04 RX ORDER — ATORVASTATIN CALCIUM 40 MG/1
40 TABLET, FILM COATED ORAL DAILY
Qty: 30 TABLET | Refills: 11 | Status: SHIPPED | OUTPATIENT
Start: 2024-11-04

## 2024-11-06 ENCOUNTER — OFFICE VISIT (OUTPATIENT)
Age: 59
End: 2024-11-06
Payer: COMMERCIAL

## 2024-11-06 VITALS — RESPIRATION RATE: 18 BRPM | WEIGHT: 147 LBS | HEIGHT: 61 IN | BODY MASS INDEX: 27.75 KG/M2

## 2024-11-06 DIAGNOSIS — M75.81 TENDINITIS OF RIGHT ROTATOR CUFF: Primary | ICD-10-CM

## 2024-11-06 RX ORDER — TRIAMCINOLONE ACETONIDE 40 MG/ML
40 INJECTION, SUSPENSION INTRA-ARTICULAR; INTRAMUSCULAR ONCE
Status: COMPLETED | OUTPATIENT
Start: 2024-11-06 | End: 2024-11-06

## 2024-11-06 RX ORDER — LIDOCAINE HYDROCHLORIDE 10 MG/ML
2 INJECTION, SOLUTION INFILTRATION; PERINEURAL ONCE
Status: COMPLETED | OUTPATIENT
Start: 2024-11-06 | End: 2024-11-06

## 2024-11-06 RX ADMIN — TRIAMCINOLONE ACETONIDE 40 MG: 40 INJECTION, SUSPENSION INTRA-ARTICULAR; INTRAMUSCULAR at 08:15

## 2024-11-06 RX ADMIN — LIDOCAINE HYDROCHLORIDE 2 ML: 10 INJECTION, SOLUTION INFILTRATION; PERINEURAL at 08:15

## 2024-11-06 NOTE — PROGRESS NOTES
[facility]  Torres Bentley MD, PhD  Jorden Bentley PA-C    CHIEF COMPLAINT  Initial Evaluation of the Right Shoulder (Patient presents today for right shoulder pain for 6 weeks. X-ray performed on 10/14/24. Patient complains of pain with ROM. )       HISTORY OF PRESENT ILLNESS    History of Present Illness  The patient is a 59-year-old female, new patient here for right shoulder pain.    She initially saw her primary care physician (PCP) on 10/14/2024 and reported that the pain began approximately 2 weeks prior. The pain is located specifically in the deltoid area of her right shoulder. She has experienced a normal range of motion, although it is limited when reaching behind her back. There was no specific injury or previous issues with this shoulder.    Her primary care physician got an x-ray, which did not reveal any abnormalities. The pain has been progressively worsening. She reports no pain upon touch.    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 She has a large dog at home. Her primary care physician suggested a steroid injection for pain relief, but she declined. Instead, she has been using Salonpas patches for relief.       HISTORY    Current Outpatient Medications   Medication Instructions    aspirin 81 mg, Daily    atorvastatin (LIPITOR) 40 mg, Oral, Daily    clopidogrel (PLAVIX) 75 mg, Oral, Daily    clotrimazole-betamethasone (LOTRISONE) 1-0.05 % cream 1 Application, Topical, 2 Times Daily, Apply to affected area bid    fenofibrate (TRICOR) 145 mg, Oral, Daily    fluticasone (FLONASE) 50 MCG/ACT nasal spray 1 spray, Nasal, Daily    lisinopril-hydrochlorothiazide (PRINZIDE,ZESTORETIC) 20-12.5 MG per tablet 1 tablet, Oral, 2 Times Daily    metFORMIN (GLUCOPHAGE) 1,000 mg, 2 Times Daily With Meals    omeprazole (PRILOSEC) 40 mg, Oral, Daily    Tirzepatide 7.5 mg, Subcutaneous, Weekly    vitamin D (ERGOCALCIFEROL)  50,000 Units, Oral, Weekly         reports that she has been smoking cigarettes. She has a 30 pack-year smoking history. She has been exposed to tobacco smoke. She has never used smokeless tobacco. She reports that she does not drink alcohol and does not use drugs.    Past Medical History:   Diagnosis Date    Anxiety     Depression     Diabetes mellitus     GA (granuloma annulare)     GERD (gastroesophageal reflux disease)     Hypertension     Low back pain     PAD (peripheral artery disease)         Past Surgical History:   Procedure Laterality Date    AORTAGRAM Left 2023    Procedure: LEFT LOWER EXTREMITY ANGIOGRAM, BALLOON ANGIOPLASTY MYNX CLOSURE;  Surgeon: Kei Jones DO;  Location: [facility] HYBRID OR 12;  Service: Vascular;  Laterality: Left;    CARDIAC CATHETERIZATION      CARDIAC CATHETERIZATION N/A 3/22/2024    Procedure: Left Heart Cath;  Surgeon: Duncan Norton DO;  Location: [facility] CATH INVASIVE LOCATION;  Service: Cardiovascular;  Laterality: N/A;     SECTION      COLONOSCOPY N/A 2023    Procedure: COLONOSCOPY WITH ANESTHESIA;  Surgeon: Mushtaq Crews MD;  Location: [facility] ENDOSCOPY;  Service: Gastroenterology;  Laterality: N/A;  preop; diarrhea  postop; polyps; diverticulosis   PCP Ana Hernandez     ILIAC ARTERY STENT Bilateral     IR ANGIOGRAM EXTREMITY UNILATERAL          PHYSICAL EXAM  Constitutional: The patient is in no apparent distress and generally well-appearing. The patient hears me clearly and answers questions appropriately.   Musculoskeletal:  Physical Exam  RIGHT SHOULDER  Limited internal rotation to the lumbar spine. Shoulder abduction is to 100 degrees, then pain starts. Clavicle is nontender, AC joint is nontender, subacromial area is nontender, bicipital groove is slightly tender, posterior glenohumeral joint is nontender. Trapezius shows tightness and slight tenderness, possible trigger points. Positive empty can test, positive Patel,  positive Neer, positive Speed's test. Normal strength with external rotation. Pain and slight weakness with internal rotation. Some pain with resisted abduction, but normal strength.      IMAGING    XR Shoulder 2+ View Right (In Office)    Result Date: 10/14/2024  Narrative: XR SHOULDER 2+ VW RIGHT- 10/14/2024 8:56 AM  History: right shoulder pain; M25.511-Pain in right shoulder  Comparison: None  Findings: Internal rotation, external rotation and scapular Y views of the right shoulder are submitted.  There is no acute fracture or joint subluxation. The acromioclavicular joint is well-maintained. The glenohumeral joint is well-maintained. The visualized chest wall is unremarkable and the visualized lung fields are clear.      Impression: Impression: 1. Unremarkable radiographs of the right shoulder.    This report was signed and finalized on 10/14/2024 10:27 AM by Dr Jose Raul Tracey.        Results  Imaging  X-rays of the right shoulder were normal.       ASSESSMENT & PLAN  Diagnoses and all orders for this visit:    1. Tendinitis of right rotator cuff (Primary)  -     lidocaine (XYLOCAINE) 1 % injection 2 mL  -     triamcinolone acetonide (KENALOG-40) injection 40 mg         Assessment & Plan  1. Rotator cuff tendinitis.  The patient's symptoms and physical examination findings suggest rotator cuff tendinitis and impingement. The x-ray results are normal, showing no signs of arthritis, fractures, old injuries, or bone spurs.  The pain is likely due to irritation of the rotator cuff tendons, but I do not feel that she has a large rotator cuff tear. A subacromial injection will be administered today to alleviate the inflammation and allow her to start rehabbing the shoulder. She is advised to continue using Salonpas patches if they provide relief. Home exercises will be initiated after a week or two, depending on her comfort level.  We also talked about formal physical therapy but she would like to start with home  "exercises for now    Follow-up  Return in a few weeks for follow up.    Right shoulder injection today  Continue OTC analgesics as needed  Home exercises     Shoulder Injection Procedure Note    Right shoulder injection was discussed with the patient in detail, including indication, risks, benefits, and alternatives. Risks include but are not limited to: incomplete symptom resolution, injection site pain, local irritation, bleeding, infection, allergic reaction, elevated blood pressure and blood sugar. Verbal consent was given for the procedure.  Injection site was identified by physical examination and cleaned with Chloraprep and alcohol swabs. Prior to needle insertion, ethyl chloride spray was used for surface anesthesia.  A 22-gauge, 1.5\" needle was directed to the joint from a(n) posterior subacromial approach. Injectate was passed into the shoulder without difficulty. The needle was removed and a simple bandage was applied. The procedure was tolerated well without difficulty.    Injection mixture:  1% plain lidocaine: 2 mL  40 mg/mL triamcinolone acetonide: 1 mL        FOLLOW-UP  Return in about 3 weeks (around 11/27/2024).    Patient or patient representative verbalized consent for the use of Ambient Listening during the visit with  Torres Bentley MD for chart documentation. 11/6/2024  12:55 CST    Torres Bentley MD, PhD  "

## 2024-11-08 ENCOUNTER — PATIENT ROUNDING (BHMG ONLY) (OUTPATIENT)
Age: 59
End: 2024-11-08
Payer: COMMERCIAL

## 2024-11-08 NOTE — PROGRESS NOTES
November 8, 2024    Hello, may I speak with Catrachita SCHMIDT May?    My name is MEDARDO.      I am  with [facility]  [address] [place] [id number] [phone].    Before we get started may I verify your date of birth? 1965    I am calling to officially welcome you to our practice and ask about your recent visit. Is this a good time to talk? yes    Tell me about your visit with us. What things went well?  VERY GOOD VISIT IN AND OUT TREATMENT WORKING WELL       We're always looking for ways to make our patients' experiences even better. Do you have recommendations on ways we may improve?  no    Overall were you satisfied with your first visit to our practice? yes       I appreciate you taking the time to speak with me today. Is there anything else I can do for you? no      Thank you, and have a great day.

## 2024-12-11 ENCOUNTER — OFFICE VISIT (OUTPATIENT)
Dept: INTERNAL MEDICINE | Facility: CLINIC | Age: 59
End: 2024-12-11
Payer: COMMERCIAL

## 2024-12-11 VITALS
DIASTOLIC BLOOD PRESSURE: 79 MMHG | WEIGHT: 137.8 LBS | TEMPERATURE: 97.8 F | SYSTOLIC BLOOD PRESSURE: 131 MMHG | BODY MASS INDEX: 26.01 KG/M2 | OXYGEN SATURATION: 98 % | HEIGHT: 61 IN | HEART RATE: 95 BPM

## 2024-12-11 DIAGNOSIS — J01.00 ACUTE MAXILLARY SINUSITIS, RECURRENCE NOT SPECIFIED: ICD-10-CM

## 2024-12-11 DIAGNOSIS — J02.9 SORE THROAT: Primary | ICD-10-CM

## 2024-12-11 LAB
EXPIRATION DATE: NORMAL
INTERNAL CONTROL: NORMAL
Lab: NORMAL
S PYO AG THROAT QL: NEGATIVE

## 2024-12-11 PROCEDURE — 99214 OFFICE O/P EST MOD 30 MIN: CPT | Performed by: FAMILY MEDICINE

## 2024-12-11 PROCEDURE — 87880 STREP A ASSAY W/OPTIC: CPT | Performed by: FAMILY MEDICINE

## 2024-12-11 RX ORDER — METHYLPREDNISOLONE 4 MG/1
TABLET ORAL
Qty: 21 TABLET | Refills: 0 | Status: SHIPPED | OUTPATIENT
Start: 2024-12-11 | End: 2024-12-25

## 2024-12-11 RX ORDER — AMOXICILLIN 500 MG/1
500 CAPSULE ORAL 2 TIMES DAILY
Qty: 20 CAPSULE | Refills: 0 | Status: SHIPPED | OUTPATIENT
Start: 2024-12-11 | End: 2024-12-21

## 2024-12-11 RX ORDER — GUAIFENESIN 600 MG/1
600 TABLET, EXTENDED RELEASE ORAL 2 TIMES DAILY
Qty: 20 TABLET | Refills: 0 | Status: SHIPPED | OUTPATIENT
Start: 2024-12-11 | End: 2024-12-21

## 2024-12-11 NOTE — PROGRESS NOTES
Subjective     Chief Complaint   Patient presents with    Sore Throat    Cough       Sore Throat   Associated symptoms include coughing.   Cough  Associated symptoms include a sore throat.       Patient's PMR from outside medical facility reviewed and noted.    Catrachita Argueta is a 59 y.o. female who presents for a sick visit today.  The patient states she has significant head congestion with mild associated cough and congestion.   Drainage has been yellowish green in color, as well as any coughed up sputum..  There has been some facial pain and pressure.  Patient reports no fever, myalgias, nausea, vomiting or diarrhea associated with this at this time.  Patient reports that this has been going on for 2 days at this point, and would like something to help with their sinusitis at this time.      Past Medical History:   Past Medical History:   Diagnosis Date    Anxiety     Depression     Diabetes mellitus     GA (granuloma annulare)     GERD (gastroesophageal reflux disease)     Hypertension     Low back pain     PAD (peripheral artery disease)      Past Surgical History:  Past Surgical History:   Procedure Laterality Date    AORTAGRAM Left 2023    Procedure: LEFT LOWER EXTREMITY ANGIOGRAM, BALLOON ANGIOPLASTY MYNX CLOSURE;  Surgeon: Kei Jones DO;  Location: [facility] HYBRID OR 12;  Service: Vascular;  Laterality: Left;    CARDIAC CATHETERIZATION      CARDIAC CATHETERIZATION N/A 3/22/2024    Procedure: Left Heart Cath;  Surgeon: Duncan Norton DO;  Location: [facility] CATH INVASIVE LOCATION;  Service: Cardiovascular;  Laterality: N/A;     SECTION      COLONOSCOPY N/A 2023    Procedure: COLONOSCOPY WITH ANESTHESIA;  Surgeon: Mushtaq Crews MD;  Location: [facility] ENDOSCOPY;  Service: Gastroenterology;  Laterality: N/A;  preop; diarrhea  postop; polyps; diverticulosis   PCP Ana Hernandez     ILIAC ARTERY STENT Bilateral     IR ANGIOGRAM EXTREMITY UNILATERAL       Social History:   reports that she has been smoking cigarettes. She has a 30 pack-year smoking history. She has been exposed to tobacco smoke. She has never used smokeless tobacco. She reports that she does not drink alcohol and does not use drugs.    Family History: family history includes No Known Problems in her father and mother. She was adopted.      Allergies:  No Known Allergies  Medications:  Prior to Admission medications    Medication Sig Start Date End Date Taking? Authorizing Provider   aspirin 81 MG EC tablet Take 1 tablet by mouth Daily.   Yes Sanjuana Esposito MD   atorvastatin (LIPITOR) 40 MG tablet Take 1 tablet by mouth Daily. 11/4/24  Yes William Ellis MD   clopidogrel (PLAVIX) 75 MG tablet Take 1 tablet by mouth Daily. 2/27/24  Yes William Ellis MD   clotrimazole-betamethasone (LOTRISONE) 1-0.05 % cream Apply 1 Application topically to the appropriate area as directed 2 (Two) Times a Day. Apply to affected area bid 6/6/24  Yes William Ellis MD   fenofibrate (TRICOR) 145 MG tablet Take 1 tablet by mouth Daily. 2/27/24  Yes William Ellis MD   fluticasone (FLONASE) 50 MCG/ACT nasal spray 1 spray into the nostril(s) as directed by provider Daily. 12/26/23  Yes William Ellis MD   lisinopril-hydrochlorothiazide (PRINZIDE,ZESTORETIC) 20-12.5 MG per tablet Take 1 tablet by mouth 2 (Two) Times a Day. 2/27/24  Yes William Ellis MD   metFORMIN (GLUCOPHAGE) 1000 MG tablet Take 1 tablet by mouth 2 (Two) Times a Day With Meals.   Yes Sanjuana Esposito MD   omeprazole (priLOSEC) 40 MG capsule Take 1 capsule by mouth Daily. 2/27/24  Yes William Ellis MD   Tirzepatide (MOUNJARO) 7.5 MG/0.5ML solution pen-injector pen Inject 0.5 mL under the skin into the appropriate area as directed 1 (One) Time Per Week. 8/27/24  Yes William Ellis MD   vitamin D (ERGOCALCIFEROL) 1.25 MG (23923 UT) capsule capsule Take 1 capsule by mouth 1 (One)  "Time Per Week. 2/27/24  Yes William Ellis MD           Review of systems   negative unless otherwise specified above in HPI    Objective     Vital Signs: /79 (BP Location: Left arm, Patient Position: Sitting, Cuff Size: Adult)   Pulse 95   Temp 97.8 °F (36.6 °C) (Infrared)   Ht 154.9 cm (61\")   Wt 62.5 kg (137 lb 12.8 oz)   LMP  (LMP Unknown) Comment: 2015ish  SpO2 98%   BMI 26.04 kg/m²     Physical Exam  Vitals and nursing note reviewed.   Constitutional:       General: She is not in acute distress.     Appearance: Normal appearance.   HENT:      Head: Normocephalic.      Nose: Congestion and rhinorrhea present.      Mouth/Throat:      Mouth: Mucous membranes are moist.   Eyes:      Extraocular Movements: Extraocular movements intact.      Pupils: Pupils are equal, round, and reactive to light.   Cardiovascular:      Rate and Rhythm: Normal rate and regular rhythm.      Heart sounds: Normal heart sounds. No murmur heard.  Pulmonary:      Effort: Pulmonary effort is normal. No respiratory distress.      Breath sounds: Normal breath sounds. No rhonchi or rales.   Abdominal:      General: Abdomen is flat. Bowel sounds are normal.      Palpations: Abdomen is soft.   Neurological:      General: No focal deficit present.      Mental Status: She is alert.                Results Reviewed:  Glucose   Date Value Ref Range Status   10/14/2024 168 (H) 70 - 99 mg/dL Final   03/22/2024 311 (H) 65 - 99 mg/dL Final     BUN   Date Value Ref Range Status   10/14/2024 11 6 - 24 mg/dL Final   03/22/2024 29 (H) 6 - 20 mg/dL Final     Creatinine   Date Value Ref Range Status   10/14/2024 0.78 0.57 - 1.00 mg/dL Final   04/12/2024 0.80 0.60 - 1.30 mg/dL Final     Comment:     Serial Number: [id number]:  [id number]     Sodium   Date Value Ref Range Status   10/14/2024 136 134 - 144 mmol/L Final   03/22/2024 134 (L) 136 - 145 mmol/L Final     Potassium   Date Value Ref Range Status   10/14/2024 4.4 3.5 - 5.2 mmol/L " Final   03/22/2024 4.3 3.5 - 5.2 mmol/L Final     Chloride   Date Value Ref Range Status   10/14/2024 100 96 - 106 mmol/L Final   03/22/2024 98 98 - 107 mmol/L Final     CO2   Date Value Ref Range Status   03/22/2024 24.0 22.0 - 29.0 mmol/L Final     Total CO2   Date Value Ref Range Status   10/14/2024 20 20 - 29 mmol/L Final     Calcium   Date Value Ref Range Status   10/14/2024 10.6 (H) 8.7 - 10.2 mg/dL Final   03/22/2024 10.0 8.6 - 10.5 mg/dL Final     ALT (SGPT)   Date Value Ref Range Status   10/14/2024 34 (H) 0 - 32 IU/L Final     AST (SGOT)   Date Value Ref Range Status   10/14/2024 19 0 - 40 IU/L Final     WBC   Date Value Ref Range Status   03/22/2024 8.65 3.40 - 10.80 10*3/mm3 Final   02/26/2024 8.8 3.4 - 10.8 x10E3/uL Final     Hematocrit   Date Value Ref Range Status   03/22/2024 39.3 34.0 - 46.6 % Final     Platelets   Date Value Ref Range Status   03/22/2024 370 140 - 450 10*3/mm3 Final     Triglycerides   Date Value Ref Range Status   10/14/2024 331 (H) 0 - 149 mg/dL Final     HDL Cholesterol   Date Value Ref Range Status   10/14/2024 35 (L) >39 mg/dL Final     LDL Chol Calc (NIH)   Date Value Ref Range Status   10/14/2024 91 0 - 99 mg/dL Final     Hemoglobin A1C   Date Value Ref Range Status   10/14/2024 8.1 (H) 4.8 - 5.6 % Final     Comment:              Prediabetes: 5.7 - 6.4           Diabetes: >6.4           Glycemic control for adults with diabetes: <7.0     07/19/2023 7.8 % Final     The following data was reviewed by: William Ellis MD on 12/11/2024:  Strep          12/11/2024    13:44   Common Labs   POC Strep A, Molecular Negative          Procedure   Procedures       Assessment / Plan     Assessment/Plan:   Diagnosis Plan   1. Sore throat  POCT rapid strep A      2. Acute maxillary sinusitis, recurrence not specified  amoxicillin (AMOXIL) 500 MG capsule    methylPREDNISolone (MEDROL) 4 MG dose pack    guaiFENesin (Mucinex) 600 MG 12 hr tablet            Return if symptoms worsen  or fail to improve. unless patient needs to be seen sooner or acute issues arise.      I have discussed the patient results/orders and and plan/recommendation with them at today's visit.      Signed by:    William Ellis MD Date: 12/11/24

## 2024-12-18 ENCOUNTER — OFFICE VISIT (OUTPATIENT)
Dept: INTERNAL MEDICINE | Facility: CLINIC | Age: 59
End: 2024-12-18
Payer: COMMERCIAL

## 2024-12-18 VITALS
DIASTOLIC BLOOD PRESSURE: 66 MMHG | BODY MASS INDEX: 26.06 KG/M2 | HEART RATE: 123 BPM | OXYGEN SATURATION: 94 % | WEIGHT: 138 LBS | HEIGHT: 61 IN | SYSTOLIC BLOOD PRESSURE: 94 MMHG | TEMPERATURE: 96 F

## 2024-12-18 DIAGNOSIS — J40 BRONCHITIS: Primary | ICD-10-CM

## 2024-12-18 PROCEDURE — 99213 OFFICE O/P EST LOW 20 MIN: CPT | Performed by: FAMILY MEDICINE

## 2024-12-18 RX ORDER — FLUCONAZOLE 100 MG/1
100 TABLET ORAL DAILY
Qty: 5 TABLET | Refills: 0 | Status: SHIPPED | OUTPATIENT
Start: 2024-12-18 | End: 2024-12-23

## 2024-12-18 RX ORDER — LEVOFLOXACIN 500 MG/1
500 TABLET, FILM COATED ORAL DAILY
Qty: 7 TABLET | Refills: 0 | Status: SHIPPED | OUTPATIENT
Start: 2024-12-18 | End: 2024-12-25

## 2024-12-18 RX ORDER — DEXTROMETHORPHAN POLISTIREX 30 MG/5ML
60 SUSPENSION ORAL EVERY 12 HOURS SCHEDULED
Qty: 280 ML | Refills: 0 | Status: SHIPPED | OUTPATIENT
Start: 2024-12-18

## 2024-12-18 RX ORDER — ALBUTEROL SULFATE 90 UG/1
2 INHALANT RESPIRATORY (INHALATION) EVERY 4 HOURS PRN
Qty: 6.7 G | Refills: 0 | Status: SHIPPED | OUTPATIENT
Start: 2024-12-18

## 2024-12-18 NOTE — PROGRESS NOTES
Subjective     Chief Complaint   Patient presents with    Illness     Still having sinus problem  cough         Sore Throat   Associated symptoms include coughing.   Cough  Associated symptoms include a sore throat.       Patient's PMR from outside medical facility reviewed and noted.    Catrachita Argueta is a 59 y.o. female who presents for a sick visit today.  The patient states she has significant head congestion with mild associated cough and congestion.   Patient initially had had only a sore throat however this is progressed and gotten significantly worse she now has what appears to be a bronchitis and appears to be dehydrated offered patient IV fluids however she would prefer to take oral hydration and is toño work on that at home patient now has rough breath sounds and we will go ahead and start her on different antibiotics as well as giving her an inhaler and something for the cough.    Past Medical History:   Past Medical History:   Diagnosis Date    Anxiety     Depression     Diabetes mellitus     GA (granuloma annulare)     GERD (gastroesophageal reflux disease)     Hypertension     Low back pain     PAD (peripheral artery disease)      Past Surgical History:  Past Surgical History:   Procedure Laterality Date    AORTAGRAM Left 2023    Procedure: LEFT LOWER EXTREMITY ANGIOGRAM, BALLOON ANGIOPLASTY MYNX CLOSURE;  Surgeon: Kei Jones DO;  Location: [facility] HYBRID OR 12;  Service: Vascular;  Laterality: Left;    CARDIAC CATHETERIZATION      CARDIAC CATHETERIZATION N/A 3/22/2024    Procedure: Left Heart Cath;  Surgeon: Duncan Norton DO;  Location: [facility] CATH INVASIVE LOCATION;  Service: Cardiovascular;  Laterality: N/A;     SECTION      COLONOSCOPY N/A 2023    Procedure: COLONOSCOPY WITH ANESTHESIA;  Surgeon: Mushtaq Crews MD;  Location: [facility] ENDOSCOPY;  Service: Gastroenterology;  Laterality: N/A;  preop; diarrhea  postop; polyps; diverticulosis   PCP Ali  David     ILIAC ARTERY STENT Bilateral     IR ANGIOGRAM EXTREMITY UNILATERAL       Social History:  reports that she has been smoking cigarettes. She has a 30 pack-year smoking history. She has been exposed to tobacco smoke. She has never used smokeless tobacco. She reports that she does not drink alcohol and does not use drugs.    Family History: family history includes No Known Problems in her father and mother. She was adopted.      Allergies:  No Known Allergies  Medications:  Prior to Admission medications    Medication Sig Start Date End Date Taking? Authorizing Provider   aspirin 81 MG EC tablet Take 1 tablet by mouth Daily.   Yes Sanjuana Esposito MD   atorvastatin (LIPITOR) 40 MG tablet Take 1 tablet by mouth Daily. 11/4/24  Yes William Ellis MD   clopidogrel (PLAVIX) 75 MG tablet Take 1 tablet by mouth Daily. 2/27/24  Yes William Ellis MD   clotrimazole-betamethasone (LOTRISONE) 1-0.05 % cream Apply 1 Application topically to the appropriate area as directed 2 (Two) Times a Day. Apply to affected area bid 6/6/24  Yes William Ellis MD   fenofibrate (TRICOR) 145 MG tablet Take 1 tablet by mouth Daily. 2/27/24  Yes William Ellis MD   fluticasone (FLONASE) 50 MCG/ACT nasal spray 1 spray into the nostril(s) as directed by provider Daily. 12/26/23  Yes William Ellis MD   lisinopril-hydrochlorothiazide (PRINZIDE,ZESTORETIC) 20-12.5 MG per tablet Take 1 tablet by mouth 2 (Two) Times a Day. 2/27/24  Yes William Ellis MD   metFORMIN (GLUCOPHAGE) 1000 MG tablet Take 1 tablet by mouth 2 (Two) Times a Day With Meals.   Yes Sanjuana Esposito MD   omeprazole (priLOSEC) 40 MG capsule Take 1 capsule by mouth Daily. 2/27/24  Yes William Ellis MD   Tirzepatide (MOUNJARO) 7.5 MG/0.5ML solution pen-injector pen Inject 0.5 mL under the skin into the appropriate area as directed 1 (One) Time Per Week. 8/27/24  Yes William Ellis  "MD Eliceo   vitamin D (ERGOCALCIFEROL) 1.25 MG (23215 UT) capsule capsule Take 1 capsule by mouth 1 (One) Time Per Week. 2/27/24  Yes William Ellis MD           Review of systems   negative unless otherwise specified above in HPI    Objective     Vital Signs: BP 94/66 (BP Location: Left arm, Patient Position: Sitting, Cuff Size: Adult)   Pulse (!) 123   Temp 96 °F (35.6 °C) (Infrared)   Ht 154.9 cm (61\")   Wt 62.6 kg (138 lb)   LMP  (LMP Unknown) Comment: 2015ish  SpO2 94%   BMI 26.07 kg/m²     Physical Exam  Vitals and nursing note reviewed.   Constitutional:       General: She is not in acute distress.     Appearance: Normal appearance.   HENT:      Head: Normocephalic.      Nose: No congestion or rhinorrhea.      Mouth/Throat:      Mouth: Mucous membranes are moist.   Eyes:      Extraocular Movements: Extraocular movements intact.      Pupils: Pupils are equal, round, and reactive to light.   Cardiovascular:      Rate and Rhythm: Normal rate and regular rhythm.      Heart sounds: Normal heart sounds. No murmur heard.  Pulmonary:      Effort: Pulmonary effort is normal. No respiratory distress.      Breath sounds: Rhonchi present. No rales.   Abdominal:      General: Abdomen is flat. Bowel sounds are normal.      Palpations: Abdomen is soft.   Neurological:      General: No focal deficit present.      Mental Status: She is alert.                Results Reviewed:  Glucose   Date Value Ref Range Status   10/14/2024 168 (H) 70 - 99 mg/dL Final   03/22/2024 311 (H) 65 - 99 mg/dL Final     BUN   Date Value Ref Range Status   10/14/2024 11 6 - 24 mg/dL Final   03/22/2024 29 (H) 6 - 20 mg/dL Final     Creatinine   Date Value Ref Range Status   10/14/2024 0.78 0.57 - 1.00 mg/dL Final   04/12/2024 0.80 0.60 - 1.30 mg/dL Final     Comment:     Serial Number: [id number]:  [id number]     Sodium   Date Value Ref Range Status   10/14/2024 136 134 - 144 mmol/L Final   03/22/2024 134 (L) 136 - 145 mmol/L Final "     Potassium   Date Value Ref Range Status   10/14/2024 4.4 3.5 - 5.2 mmol/L Final   03/22/2024 4.3 3.5 - 5.2 mmol/L Final     Chloride   Date Value Ref Range Status   10/14/2024 100 96 - 106 mmol/L Final   03/22/2024 98 98 - 107 mmol/L Final     CO2   Date Value Ref Range Status   03/22/2024 24.0 22.0 - 29.0 mmol/L Final     Total CO2   Date Value Ref Range Status   10/14/2024 20 20 - 29 mmol/L Final     Calcium   Date Value Ref Range Status   10/14/2024 10.6 (H) 8.7 - 10.2 mg/dL Final   03/22/2024 10.0 8.6 - 10.5 mg/dL Final     ALT (SGPT)   Date Value Ref Range Status   10/14/2024 34 (H) 0 - 32 IU/L Final     AST (SGOT)   Date Value Ref Range Status   10/14/2024 19 0 - 40 IU/L Final     WBC   Date Value Ref Range Status   03/22/2024 8.65 3.40 - 10.80 10*3/mm3 Final   02/26/2024 8.8 3.4 - 10.8 x10E3/uL Final     Hematocrit   Date Value Ref Range Status   03/22/2024 39.3 34.0 - 46.6 % Final     Platelets   Date Value Ref Range Status   03/22/2024 370 140 - 450 10*3/mm3 Final     Triglycerides   Date Value Ref Range Status   10/14/2024 331 (H) 0 - 149 mg/dL Final     HDL Cholesterol   Date Value Ref Range Status   10/14/2024 35 (L) >39 mg/dL Final     LDL Chol Calc (NIH)   Date Value Ref Range Status   10/14/2024 91 0 - 99 mg/dL Final     Hemoglobin A1C   Date Value Ref Range Status   10/14/2024 8.1 (H) 4.8 - 5.6 % Final     Comment:              Prediabetes: 5.7 - 6.4           Diabetes: >6.4           Glycemic control for adults with diabetes: <7.0     07/19/2023 7.8 % Final     The following data was reviewed by: William Ellis MD on 12/11/2024:  Strep          12/11/2024    13:44   Common Labs   POC Strep A, Molecular Negative          Procedure   Procedures       Assessment / Plan     Assessment/Plan:   Diagnosis Plan   1. Bronchitis  levoFLOXacin (LEVAQUIN) 500 MG tablet    fluconazole (Diflucan) 100 MG tablet    dextromethorphan polistirex ER (Delsym) 30 MG/5ML Suspension Extended Release oral  suspension    albuterol sulfate  (90 Base) MCG/ACT inhaler              No follow-ups on file. unless patient needs to be seen sooner or acute issues arise.      I have discussed the patient results/orders and and plan/recommendation with them at today's visit.      Signed by:    William Ellis MD Date: 12/18/24

## 2025-01-15 RX ORDER — TIRZEPATIDE 7.5 MG/.5ML
7.5 INJECTION, SOLUTION SUBCUTANEOUS WEEKLY
Qty: 2 ML | Refills: 2 | Status: SHIPPED | OUTPATIENT
Start: 2025-01-15

## 2025-02-28 DIAGNOSIS — I10 PRIMARY HYPERTENSION: ICD-10-CM

## 2025-02-28 DIAGNOSIS — K21.9 GASTROESOPHAGEAL REFLUX DISEASE, UNSPECIFIED WHETHER ESOPHAGITIS PRESENT: ICD-10-CM

## 2025-02-28 DIAGNOSIS — E78.5 DYSLIPIDEMIA: ICD-10-CM

## 2025-02-28 RX ORDER — LISINOPRIL AND HYDROCHLOROTHIAZIDE 12.5; 2 MG/1; MG/1
1 TABLET ORAL 2 TIMES DAILY
Qty: 180 TABLET | Refills: 3 | Status: SHIPPED | OUTPATIENT
Start: 2025-02-28

## 2025-02-28 RX ORDER — OMEPRAZOLE 40 MG/1
40 CAPSULE, DELAYED RELEASE ORAL DAILY
Qty: 90 CAPSULE | Refills: 3 | Status: SHIPPED | OUTPATIENT
Start: 2025-02-28

## 2025-02-28 RX ORDER — FENOFIBRATE 145 MG/1
145 TABLET, COATED ORAL DAILY
Qty: 90 TABLET | Refills: 3 | Status: SHIPPED | OUTPATIENT
Start: 2025-02-28

## 2025-02-28 RX ORDER — CLOPIDOGREL BISULFATE 75 MG/1
75 TABLET ORAL DAILY
Qty: 90 TABLET | Refills: 3 | Status: SHIPPED | OUTPATIENT
Start: 2025-02-28

## 2025-02-28 NOTE — TELEPHONE ENCOUNTER
Rx Refill Note  Requested Prescriptions     Pending Prescriptions Disp Refills    omeprazole (priLOSEC) 40 MG capsule 90 capsule 3     Sig: Take 1 capsule by mouth Daily.    fenofibrate (TRICOR) 145 MG tablet 90 tablet 3     Sig: Take 1 tablet by mouth Daily.    lisinopril-hydrochlorothiazide (PRINZIDE,ZESTORETIC) 20-12.5 MG per tablet 180 tablet 3     Sig: Take 1 tablet by mouth 2 (Two) Times a Day.    metFORMIN (GLUCOPHAGE) 1000 MG tablet       Sig: Take 1 tablet by mouth 2 (Two) Times a Day With Meals.    clopidogrel (PLAVIX) 75 MG tablet 90 tablet 3     Sig: Take 1 tablet by mouth Daily.      Last office visit with prescribing clinician: 12/18/2024   Last telemedicine visit with prescribing clinician: Visit date not found   Next office visit with prescribing clinician: 3/3/2025                         Would you like a call back once the refill request has been completed: [] Yes [] No    If the office needs to give you a call back, can they leave a voicemail: [] Yes [] No    Marcela Mansfield MA  02/28/25, 15:00 CST

## 2025-03-03 ENCOUNTER — OFFICE VISIT (OUTPATIENT)
Dept: INTERNAL MEDICINE | Facility: CLINIC | Age: 60
End: 2025-03-03
Payer: COMMERCIAL

## 2025-03-03 VITALS
HEART RATE: 92 BPM | BODY MASS INDEX: 25.68 KG/M2 | SYSTOLIC BLOOD PRESSURE: 116 MMHG | WEIGHT: 136 LBS | HEIGHT: 61 IN | DIASTOLIC BLOOD PRESSURE: 70 MMHG | TEMPERATURE: 96.7 F | OXYGEN SATURATION: 98 %

## 2025-03-03 DIAGNOSIS — E78.2 MIXED HYPERLIPIDEMIA: ICD-10-CM

## 2025-03-03 DIAGNOSIS — M25.511 ACUTE PAIN OF RIGHT SHOULDER: ICD-10-CM

## 2025-03-03 DIAGNOSIS — E55.9 VITAMIN D DEFICIENCY: ICD-10-CM

## 2025-03-03 DIAGNOSIS — E11.8 TYPE 2 DIABETES MELLITUS WITH COMPLICATION, WITHOUT LONG-TERM CURRENT USE OF INSULIN: ICD-10-CM

## 2025-03-03 DIAGNOSIS — Z12.2 SCREENING FOR LUNG CANCER: Primary | ICD-10-CM

## 2025-03-03 DIAGNOSIS — Z87.891 PERSONAL HISTORY OF NICOTINE DEPENDENCE: ICD-10-CM

## 2025-03-03 DIAGNOSIS — I10 PRIMARY HYPERTENSION: ICD-10-CM

## 2025-03-03 PROCEDURE — 20610 DRAIN/INJ JOINT/BURSA W/O US: CPT | Performed by: FAMILY MEDICINE

## 2025-03-03 PROCEDURE — G0296 VISIT TO DETERM LDCT ELIG: HCPCS | Performed by: FAMILY MEDICINE

## 2025-03-03 PROCEDURE — 99214 OFFICE O/P EST MOD 30 MIN: CPT | Performed by: FAMILY MEDICINE

## 2025-03-03 RX ORDER — FLUTICASONE PROPIONATE 50 MCG
1 SPRAY, SUSPENSION (ML) NASAL DAILY
Qty: 16 G | Refills: 11 | Status: SHIPPED | OUTPATIENT
Start: 2025-03-03

## 2025-03-03 RX ORDER — TRIAMCINOLONE ACETONIDE 40 MG/ML
40 INJECTION, SUSPENSION INTRA-ARTICULAR; INTRAMUSCULAR ONCE
Status: SHIPPED | OUTPATIENT
Start: 2025-03-03

## 2025-03-03 RX ORDER — CICLOPIROX 80 MG/ML
SOLUTION TOPICAL NIGHTLY
Qty: 6 ML | Refills: 11 | Status: SHIPPED | OUTPATIENT
Start: 2025-03-03

## 2025-03-03 RX ORDER — ERGOCALCIFEROL 1.25 MG/1
50000 CAPSULE, LIQUID FILLED ORAL WEEKLY
Qty: 5 CAPSULE | Refills: 3 | Status: SHIPPED | OUTPATIENT
Start: 2025-03-03

## 2025-03-03 RX ORDER — LIDOCAINE HYDROCHLORIDE 10 MG/ML
1 INJECTION, SOLUTION INFILTRATION; PERINEURAL ONCE
Status: SHIPPED | OUTPATIENT
Start: 2025-03-03

## 2025-03-03 NOTE — PROGRESS NOTES
Subjective     Chief Complaint   Patient presents with    Annual Exam    Shoulder Pain     Tendinitis,  was given steroid shot  per ortho          History of Present Illness    Patient's PMR from outside medical facility reviewed and noted.    Catrachita Argueta is a 59 y.o. female who presents for a routine office visit.  Patient presents for follow up of Diabetes type 2. Current symptoms include: paresthesia of the feet. Patient denies foot ulcerations.  Home sugars: patient does not check sugars. Current treatments: no recent interventions. Patient is due for her diabetic labs at this time, so we will go ahead and order Cmp, HBa1c, Lipid Profile, and Microalbumin urine for evaluation.     Diabetic foot exam:   Left: Filament test present   Pulses Dorsalis Pedis:  present  Posterior Tibial:  present   Reflexes 2+    Vibratory sensation normal   Proprioception normal   Sharp/dull discrimination normal       Right: Filament test present   Pulses Dorsalis Pedis:  present  Posterior Tibial:  present   Reflexes 2+    Vibratory sensation normal   Proprioception normal   Sharp/dull discrimination normal    Lung Cancer Shared decision making Documentation:    she  reports that she has been smoking cigarettes. She has a 30 pack-year smoking history. She has been exposed to tobacco smoke. She has never used smokeless tobacco. She reports that she does not drink alcohol and does not use drugs..  Based on the recommendation of the [place] Preventive Services Task Force, this patient is at high risk for lung cancer and a low-dose CT screening scan is recommended.     The patient has had no hemoptysis, unintentional weight loss or increasing shortness of breath. The patient is asymptomatic and has no signs or symptoms of lung cancer.     Together we discussed the potential benefits and potential harms of being screened for lung cancer including the potential for follow up diagnostic testing, risk for over diagnosis,  false positive rate and radiation exposure using the [facility] Lung Cancer Screening Shared Decision-Making Tool.   We also reviewed the patient's smoking history and counseled her on the importance and health benefits of stopping the use of tobacco products.      We discussed the NCCN guidelines for lung cancer screening and the patient verbalized understanding that annual screening is recommended until fifteen years beyond smoking as long as they have no other disease or comorbidity that would prevent them from receiving cancer treatments such as surgery should a lung cancer be detected.  After review of the NCCN guidelines and recommendations for ongoing screening, the patient verbalized understanding of recommendations for follow-up.  The patient has decided to proceed with a Low Dose Lung Cancer Screening CT today    Catrachita Argueta  reports that she has been smoking cigarettes. She has a 30 pack-year smoking history. She has been exposed to tobacco smoke. She has never used smokeless tobacco. I have educated her on the risk of diseases from using tobacco products such as cancer, COPD, and heart disease.     I advised her to quit and she is not willing to quit.    I spent 5.5 minutes counseling the patient.    Patient complains of right shoulder pain that has been bothering her considerably prior x-rays showed no significant changes was seen by sports medicine advised that it was a tendinitis she received a joint injection and did better would like to go ahead and repeat that again today.    Patient's hypertension and hyperlipidemia remained stable and well-controlled.            Past Medical History:   Past Medical History:   Diagnosis Date    Anxiety     Depression     Diabetes mellitus     GA (granuloma annulare)     GERD (gastroesophageal reflux disease)     Hypertension     Low back pain     PAD (peripheral artery disease)      Past Surgical History:  Past Surgical History:   Procedure Laterality Date     AORTAGRAM Left 2023    Procedure: LEFT LOWER EXTREMITY ANGIOGRAM, BALLOON ANGIOPLASTY MYNX CLOSURE;  Surgeon: Kei Jones DO;  Location: [facility] HYBRID OR 12;  Service: Vascular;  Laterality: Left;    CARDIAC CATHETERIZATION      CARDIAC CATHETERIZATION N/A 3/22/2024    Procedure: Left Heart Cath;  Surgeon: Duncan Norton DO;  Location: [facility] CATH INVASIVE LOCATION;  Service: Cardiovascular;  Laterality: N/A;     SECTION      COLONOSCOPY N/A 2023    Procedure: COLONOSCOPY WITH ANESTHESIA;  Surgeon: Mushtaq Crews MD;  Location: [facility] ENDOSCOPY;  Service: Gastroenterology;  Laterality: N/A;  preop; diarrhea  postop; polyps; diverticulosis   PCP Ana Hernandez     ILIAC ARTERY STENT Bilateral     IR ANGIOGRAM EXTREMITY UNILATERAL       Social History:  reports that she has been smoking cigarettes. She has a 30 pack-year smoking history. She has been exposed to tobacco smoke. She has never used smokeless tobacco. She reports that she does not drink alcohol and does not use drugs.    Family History: family history includes No Known Problems in her father and mother. She was adopted.      Allergies:  No Known Allergies  Medications:  Prior to Admission medications    Medication Sig Start Date End Date Taking? Authorizing Provider   albuterol sulfate  (90 Base) MCG/ACT inhaler Inhale 2 puffs Every 4 (Four) Hours As Needed for Wheezing. 24  Yes William Ellis MD   aspirin 81 MG EC tablet Take 1 tablet by mouth Daily.   Yes Provider, MD Sanjuana   atorvastatin (LIPITOR) 40 MG tablet Take 1 tablet by mouth Daily. 24  Yes William Ellis MD   clopidogrel (PLAVIX) 75 MG tablet Take 1 tablet by mouth Daily. 25  Yes William Ellis MD   clotrimazole-betamethasone (LOTRISONE) 1-0.05 % cream Apply 1 Application topically to the appropriate area as directed 2 (Two) Times a Day. Apply to affected area bid 24  Yes William Ellis  MD Eliceo   dextromethorphan polistirex ER (Delsym) 30 MG/5ML Suspension Extended Release oral suspension Take 10 mL by mouth Every 12 (Twelve) Hours. 12/18/24  Yes William Ellis MD   fenofibrate (TRICOR) 145 MG tablet Take 1 tablet by mouth Daily. 2/28/25  Yes William Ellis MD   fluticasone (FLONASE) 50 MCG/ACT nasal spray 1 spray into the nostril(s) as directed by provider Daily. 12/26/23  Yes William Ellis MD   lisinopril-hydrochlorothiazide (PRINZIDE,ZESTORETIC) 20-12.5 MG per tablet Take 1 tablet by mouth 2 (Two) Times a Day. 2/28/25  Yes William Ellis MD   metFORMIN (GLUCOPHAGE) 1000 MG tablet Take 1 tablet by mouth 2 (Two) Times a Day With Meals. 2/28/25  Yes William Ellis MD   omeprazole (priLOSEC) 40 MG capsule Take 1 capsule by mouth Daily. 2/28/25  Yes William Ellis MD   Tirzepatide (Mounjaro) 7.5 MG/0.5ML solution auto-injector Inject 7.5 mg under the skin into the appropriate area as directed 1 (One) Time Per Week. 1/15/25  Yes William Ellis MD   Tirzepatide (MOUNJARO) 7.5 MG/0.5ML solution pen-injector pen Inject 0.5 mL under the skin into the appropriate area as directed 1 (One) Time Per Week. 8/27/24  Yes William Ellis MD   vitamin D (ERGOCALCIFEROL) 1.25 MG (72931 UT) capsule capsule Take 1 capsule by mouth 1 (One) Time Per Week. 2/27/24  Yes William Ellis MD       CONY: Over the last two weeks, how often have you been bothered by the following problems?  Feeling nervous, anxious or on edge: Not at all  Not being able to stop or control worrying: Not at all  Worrying too much about different things: Not at all  Trouble Relaxing: Not at all  Being so restless that it is hard to sit still: Not at all  Becoming easily annoyed or irritable: Not at all  Feeling afraid as if something awful might happen: Not at all  CONY 7 Total Score: 0  If you checked any problems, how difficult have these problems  "made it for you to do your work, take care of things at home, or get along with other people: Not difficult at all    PHQ:  Little interest or pleasure in doing things? Not at all   Feeling down, depressed, or hopeless? Not at all   PHQ-2 Total Score 0         0 (Negative screening for depression)  Support given, observe for worsening symptoms        Review of systems   negative unless otherwise specified above in HPI    Objective     Vital Signs: /70 (BP Location: Right arm, Patient Position: Sitting, Cuff Size: Adult)   Pulse 92   Temp 96.7 °F (35.9 °C) (Infrared)   Ht 154.9 cm (61\")   Wt 61.7 kg (136 lb)   LMP  (LMP Unknown) Comment: 2015ish  SpO2 98%   BMI 25.70 kg/m²     Physical Exam  Vitals and nursing note reviewed.   Constitutional:       General: She is not in acute distress.     Appearance: Normal appearance.   HENT:      Head: Normocephalic.   Eyes:      Extraocular Movements: Extraocular movements intact.      Pupils: Pupils are equal, round, and reactive to light.   Cardiovascular:      Rate and Rhythm: Normal rate and regular rhythm.      Heart sounds: Normal heart sounds. No murmur heard.  Pulmonary:      Effort: Pulmonary effort is normal. No respiratory distress.      Breath sounds: Normal breath sounds. No rhonchi or rales.   Abdominal:      General: Abdomen is flat. Bowel sounds are normal.      Palpations: Abdomen is soft.   Neurological:      General: No focal deficit present.      Mental Status: She is alert.                Results Reviewed:  Glucose   Date Value Ref Range Status   10/14/2024 168 (H) 70 - 99 mg/dL Final   03/22/2024 311 (H) 65 - 99 mg/dL Final     BUN   Date Value Ref Range Status   10/14/2024 11 6 - 24 mg/dL Final   03/22/2024 29 (H) 6 - 20 mg/dL Final     Creatinine   Date Value Ref Range Status   10/14/2024 0.78 0.57 - 1.00 mg/dL Final   04/12/2024 0.80 0.60 - 1.30 mg/dL Final     Comment:     Serial Number: [id number]:  [id number]     Sodium   Date Value Ref " Range Status   10/14/2024 136 134 - 144 mmol/L Final   03/22/2024 134 (L) 136 - 145 mmol/L Final     Potassium   Date Value Ref Range Status   10/14/2024 4.4 3.5 - 5.2 mmol/L Final   03/22/2024 4.3 3.5 - 5.2 mmol/L Final     Chloride   Date Value Ref Range Status   10/14/2024 100 96 - 106 mmol/L Final   03/22/2024 98 98 - 107 mmol/L Final     CO2   Date Value Ref Range Status   03/22/2024 24.0 22.0 - 29.0 mmol/L Final     Total CO2   Date Value Ref Range Status   10/14/2024 20 20 - 29 mmol/L Final     Calcium   Date Value Ref Range Status   10/14/2024 10.6 (H) 8.7 - 10.2 mg/dL Final   03/22/2024 10.0 8.6 - 10.5 mg/dL Final     ALT (SGPT)   Date Value Ref Range Status   10/14/2024 34 (H) 0 - 32 IU/L Final     AST (SGOT)   Date Value Ref Range Status   10/14/2024 19 0 - 40 IU/L Final     WBC   Date Value Ref Range Status   03/22/2024 8.65 3.40 - 10.80 10*3/mm3 Final   02/26/2024 8.8 3.4 - 10.8 x10E3/uL Final     Hematocrit   Date Value Ref Range Status   03/22/2024 39.3 34.0 - 46.6 % Final     Platelets   Date Value Ref Range Status   03/22/2024 370 140 - 450 10*3/mm3 Final     Triglycerides   Date Value Ref Range Status   10/14/2024 331 (H) 0 - 149 mg/dL Final     HDL Cholesterol   Date Value Ref Range Status   10/14/2024 35 (L) >39 mg/dL Final     LDL Chol Calc (NIH)   Date Value Ref Range Status   10/14/2024 91 0 - 99 mg/dL Final     Hemoglobin A1C   Date Value Ref Range Status   10/14/2024 8.1 (H) 4.8 - 5.6 % Final     Comment:              Prediabetes: 5.7 - 6.4           Diabetes: >6.4           Glycemic control for adults with diabetes: <7.0     07/19/2023 7.8 % Final             Procedure   Arthrocentesis    Date/Time: 3/3/2025 8:15 AM    Performed by: William Ellis MD  Authorized by: William Ellis MD  Indications: pain   Body area: shoulder  Joint: right shoulder  Local anesthesia used: no    Anesthesia:  Local anesthesia used: no    Sedation:  Patient sedated: no    Comments: Patient  requested a joint injection today due to significant joint pain.  Area was cleaned with alcohol prior to the injection and anatomy was located for proper insertion into the joint.  1 cc of Kenalog and 1 cc of lidocaine were injected into  her right shoulder using the posterior approach.   The needle was removed and sterile dressing was applied.  Patient tolerated the procedure well.            Assessment / Plan     Assessment/Plan:   Diagnosis Plan   1. Screening for lung cancer   CT Chest Low Dose Cancer Screening WO      2. Personal history of nicotine dependence   CT Chest Low Dose Cancer Screening WO      3. Type 2 diabetes mellitus with complication, without long-term current use of insulin  Microalbumin / Creatinine Urine Ratio - Urine, Clean Catch    Lipid Panel    Comprehensive Metabolic Panel    Hemoglobin A1c    TSH      4. Primary hypertension        5. Mixed hyperlipidemia        6. Vitamin D deficiency  vitamin D (ERGOCALCIFEROL) 1.25 MG (97852 UT) capsule capsule      7. Acute pain of right shoulder  lidocaine (XYLOCAINE) 1 % injection 1 mL    triamcinolone acetonide (KENALOG-40) injection 40 mg    Arthrocentesis            Return in about 3 months (around 6/3/2025) for Recheck. unless patient needs to be seen sooner or acute issues arise.      I have discussed the patient results/orders and and plan/recommendation with them at today's visit.      Signed by:    William Ellis MD Date: 03/03/25

## 2025-03-04 LAB
ALBUMIN SERPL-MCNC: 4.5 G/DL (ref 3.8–4.9)
ALBUMIN/CREAT UR: <8 MG/G CREAT (ref 0–29)
ALP SERPL-CCNC: 39 IU/L (ref 44–121)
ALT SERPL-CCNC: 18 IU/L (ref 0–32)
AST SERPL-CCNC: 12 IU/L (ref 0–40)
BILIRUB SERPL-MCNC: 0.3 MG/DL (ref 0–1.2)
BUN SERPL-MCNC: 20 MG/DL (ref 6–24)
BUN/CREAT SERPL: 29 (ref 9–23)
CALCIUM SERPL-MCNC: 10.2 MG/DL (ref 8.7–10.2)
CHLORIDE SERPL-SCNC: 103 MMOL/L (ref 96–106)
CHOLEST SERPL-MCNC: 120 MG/DL (ref 100–199)
CO2 SERPL-SCNC: 20 MMOL/L (ref 20–29)
CREAT SERPL-MCNC: 0.7 MG/DL (ref 0.57–1)
CREAT UR-MCNC: 38.7 MG/DL
EGFRCR SERPLBLD CKD-EPI 2021: 100 ML/MIN/1.73
GLOBULIN SER CALC-MCNC: 2.7 G/DL (ref 1.5–4.5)
GLUCOSE SERPL-MCNC: 104 MG/DL (ref 70–99)
HBA1C MFR BLD: 6.7 % (ref 4.8–5.6)
HDLC SERPL-MCNC: 38 MG/DL
LDLC SERPL CALC-MCNC: 60 MG/DL (ref 0–99)
MICROALBUMIN UR-MCNC: <3 UG/ML
POTASSIUM SERPL-SCNC: 4.8 MMOL/L (ref 3.5–5.2)
PROT SERPL-MCNC: 7.2 G/DL (ref 6–8.5)
SODIUM SERPL-SCNC: 138 MMOL/L (ref 134–144)
TRIGL SERPL-MCNC: 119 MG/DL (ref 0–149)
TSH SERPL DL<=0.005 MIU/L-ACNC: 1.85 UIU/ML (ref 0.45–4.5)
VLDLC SERPL CALC-MCNC: 22 MG/DL (ref 5–40)

## 2025-03-14 ENCOUNTER — OFFICE VISIT (OUTPATIENT)
Dept: INTERNAL MEDICINE | Facility: CLINIC | Age: 60
End: 2025-03-14
Payer: COMMERCIAL

## 2025-03-14 VITALS
SYSTOLIC BLOOD PRESSURE: 108 MMHG | HEART RATE: 88 BPM | BODY MASS INDEX: 25.04 KG/M2 | OXYGEN SATURATION: 98 % | WEIGHT: 132.6 LBS | DIASTOLIC BLOOD PRESSURE: 60 MMHG | HEIGHT: 61 IN

## 2025-03-14 DIAGNOSIS — J01.10 ACUTE FRONTAL SINUSITIS, RECURRENCE NOT SPECIFIED: Primary | ICD-10-CM

## 2025-03-14 PROCEDURE — 99213 OFFICE O/P EST LOW 20 MIN: CPT

## 2025-03-14 RX ORDER — GUAIFENESIN 600 MG/1
1200 TABLET, EXTENDED RELEASE ORAL 2 TIMES DAILY
Qty: 20 TABLET | Refills: 0 | Status: SHIPPED | OUTPATIENT
Start: 2025-03-14

## 2025-03-14 RX ORDER — FLUTICASONE PROPIONATE 50 MCG
1 SPRAY, SUSPENSION (ML) NASAL DAILY
Qty: 16 G | Refills: 11 | Status: SHIPPED | OUTPATIENT
Start: 2025-03-14

## 2025-03-14 RX ORDER — CEFDINIR 300 MG/1
300 CAPSULE ORAL 2 TIMES DAILY
Qty: 14 CAPSULE | Refills: 0 | Status: SHIPPED | OUTPATIENT
Start: 2025-03-14 | End: 2025-03-21

## 2025-03-14 NOTE — PROGRESS NOTES
Subjective     Chief Complaint   Patient presents with    Sinus Problem     Pt experiencing congestion, headaches and nasal drainage. Pt had stomach ache, which she believes may have been caused by drainage.       Sinus Problem  Associated symptoms include congestion and sinus pressure.     History of Present Illness  The patient presents for evaluation of a sinus infection.    She reports experiencing symptoms suggestive of a sinus infection since Wednesday. She has not had any fever or cough but does report nasal congestion. She also reports abdominal discomfort, which she describes as a constant nagging sensation that occasionally escalates to pain. This discomfort began concurrently with her nasal congestion and has been present for several days. She experienced a brief respite from the discomfort this morning, but it has since returned. Her  has bronchitis. She has not had any exposure to COVID-19 or influenza. She has not had any diarrhea, nausea, vomiting, hematochezia, or hematuria. She has not had any allergies. She has not had any previous episodes of similar discomfort, which were typically associated with sinus infections due to drainage. She recalls that her current symptoms began on Wednesday with severe abdominal pain, which was later accompanied by a headache on Thursday. She suspects these symptoms may be due to drainage. She also reports experiencing pressure above and below her eyes yesterday but not today. She is uncertain about the presence of nasal spray at home. She attempted to alleviate her symptoms with over-the-counter sinus medication yesterday, which provided temporary relief.    Supplemental Information  She is diabetic.    ALLERGIES  The patient has no known allergies.    Patient's PMR from outside medical facility reviewed and noted.    Review of Systems   Constitutional:  Negative for fever.   HENT:  Positive for congestion, postnasal drip and sinus pressure.     Gastrointestinal:  Positive for abdominal pain. Negative for diarrhea, nausea and vomiting.        Otherwise complete ROS reviewed and negative except as mentioned in the HPI.    Past Medical History:   Past Medical History:   Diagnosis Date    Anxiety     Depression     Diabetes mellitus     GA (granuloma annulare)     GERD (gastroesophageal reflux disease)     Hypertension     Low back pain     PAD (peripheral artery disease)      Past Surgical History:  Past Surgical History:   Procedure Laterality Date    AORTAGRAM Left 2023    Procedure: LEFT LOWER EXTREMITY ANGIOGRAM, BALLOON ANGIOPLASTY MYNX CLOSURE;  Surgeon: Kei Jones DO;  Location: [facility] HYBRID OR 12;  Service: Vascular;  Laterality: Left;    CARDIAC CATHETERIZATION      CARDIAC CATHETERIZATION N/A 3/22/2024    Procedure: Left Heart Cath;  Surgeon: Duncan Norton DO;  Location: [facility] CATH INVASIVE LOCATION;  Service: Cardiovascular;  Laterality: N/A;     SECTION      COLONOSCOPY N/A 2023    Procedure: COLONOSCOPY WITH ANESTHESIA;  Surgeon: Mushtaq Crews MD;  Location: [facility] ENDOSCOPY;  Service: Gastroenterology;  Laterality: N/A;  preop; diarrhea  postop; polyps; diverticulosis   PCP Ana Hernandez     ILIAC ARTERY STENT Bilateral     IR ANGIOGRAM EXTREMITY UNILATERAL       Social History:  reports that she has been smoking cigarettes. She has a 30 pack-year smoking history. She has been exposed to tobacco smoke. She has never used smokeless tobacco. She reports that she does not drink alcohol and does not use drugs.    Family History: family history includes No Known Problems in her father and mother. She was adopted.      Allergies:  No Known Allergies  Medications:  Prior to Admission medications    Medication Sig Start Date End Date Taking? Authorizing Provider   albuterol sulfate  (90 Base) MCG/ACT inhaler Inhale 2 puffs Every 4 (Four) Hours As Needed for Wheezing. 24  Yes William Ellis  MD Eliceo   aspirin 81 MG EC tablet Take 1 tablet by mouth Daily.   Yes ProviderSanjuana MD   atorvastatin (LIPITOR) 40 MG tablet Take 1 tablet by mouth Daily. 11/4/24  Yes William Ellis MD   ciclopirox (PENLAC) 8 % solution Apply  topically to the appropriate area as directed Every Night. 3/3/25  Yes William Ellis MD   clopidogrel (PLAVIX) 75 MG tablet Take 1 tablet by mouth Daily. 2/28/25  Yes William Ellis MD   clotrimazole-betamethasone (LOTRISONE) 1-0.05 % cream Apply 1 Application topically to the appropriate area as directed 2 (Two) Times a Day. Apply to affected area bid 6/6/24  Yes William Ellis MD   dextromethorphan polistirex ER (Delsym) 30 MG/5ML Suspension Extended Release oral suspension Take 10 mL by mouth Every 12 (Twelve) Hours. 12/18/24  Yes William Ellis MD   fenofibrate (TRICOR) 145 MG tablet Take 1 tablet by mouth Daily. 2/28/25  Yes William Ellis MD   fluticasone (FLONASE) 50 MCG/ACT nasal spray Administer 1 spray into the nostril(s) as directed by provider Daily. 3/3/25  Yes William Ellis MD   lisinopril-hydrochlorothiazide (PRINZIDE,ZESTORETIC) 20-12.5 MG per tablet Take 1 tablet by mouth 2 (Two) Times a Day. 2/28/25  Yes William Ellis MD   metFORMIN (GLUCOPHAGE) 1000 MG tablet Take 1 tablet by mouth 2 (Two) Times a Day With Meals. 2/28/25  Yes William Ellis MD   omeprazole (priLOSEC) 40 MG capsule Take 1 capsule by mouth Daily. 2/28/25  Yes William Ellis MD   Tirzepatide (Mounjaro) 7.5 MG/0.5ML solution auto-injector Inject 7.5 mg under the skin into the appropriate area as directed 1 (One) Time Per Week. 1/15/25  Yes William Ellis MD   Tirzepatide (MOUNJARO) 7.5 MG/0.5ML solution pen-injector pen Inject 0.5 mL under the skin into the appropriate area as directed 1 (One) Time Per Week. 8/27/24  Yes William Ellis MD   vitamin D (ERGOCALCIFEROL)  "1.25 MG (90305 UT) capsule capsule Take 1 capsule by mouth 1 (One) Time Per Week. 3/3/25  Yes William Ellis MD       CONY:        PHQ-9 Depression Screening  Little interest or pleasure in doing things?     Feeling down, depressed, or hopeless?     PHQ-2 Total Score     Trouble falling or staying asleep, or sleeping too much?     Feeling tired or having little energy?     Poor appetite or overeating?     Feeling bad about yourself - or that you are a failure or have let yourself or your family down?     Trouble concentrating on things, such as reading the newspaper or watching television?     Moving or speaking so slowly that other people could have noticed? Or the opposite - being so fidgety or restless that you have been moving around a lot more than usual?     Thoughts that you would be better off dead, or of hurting yourself in some way?     PHQ-9 Total Score     If you checked off any problems, how difficult have these problems made it for you to do your work, take care of things at home, or get along with other people?       Objective     Vital Signs: /60 (BP Location: Right arm, Patient Position: Sitting, Cuff Size: Adult)   Pulse 88   Ht 154.9 cm (61\")   Wt 60.1 kg (132 lb 9.6 oz)   LMP  (LMP Unknown) Comment: 2015ish  SpO2 98%   BMI 25.05 kg/m²   Physical Exam  Vitals and nursing note reviewed.   Constitutional:       General: She is not in acute distress.     Appearance: Normal appearance. She is not ill-appearing.   HENT:      Head: Normocephalic and atraumatic.      Right Ear: External ear normal.      Left Ear: External ear normal.      Ears:      Comments: BL TM bulging       Nose: Nose normal. Congestion present.      Mouth/Throat:      Mouth: Mucous membranes are moist.      Pharynx: Posterior oropharyngeal erythema present.   Eyes:      General: No scleral icterus.     Extraocular Movements: Extraocular movements intact.      Conjunctiva/sclera: Conjunctivae normal.      " Pupils: Pupils are equal, round, and reactive to light.   Cardiovascular:      Rate and Rhythm: Normal rate and regular rhythm.      Pulses: Normal pulses.      Heart sounds: Normal heart sounds.   Pulmonary:      Effort: Pulmonary effort is normal. No respiratory distress.      Breath sounds: Normal breath sounds. No wheezing.   Abdominal:      General: Abdomen is flat. Bowel sounds are normal.      Palpations: Abdomen is soft.      Tenderness: There is no abdominal tenderness.   Musculoskeletal:         General: Normal range of motion.      Cervical back: Normal range of motion.      Right lower leg: No edema.      Left lower leg: No edema.   Skin:     General: Skin is warm and dry.      Findings: No erythema or rash.   Neurological:      General: No focal deficit present.      Mental Status: She is alert and oriented to person, place, and time. Mental status is at baseline.      Motor: No weakness.   Psychiatric:         Mood and Affect: Mood normal.         Behavior: Behavior normal.         Thought Content: Thought content normal.         Judgment: Judgment normal.                Advance Care Planning   ACP discussion was held with the patient during this visit.         Results Reviewed:  Glucose   Date Value Ref Range Status   03/03/2025 104 (H) 70 - 99 mg/dL Final   03/22/2024 311 (H) 65 - 99 mg/dL Final     BUN   Date Value Ref Range Status   03/03/2025 20 6 - 24 mg/dL Final   03/22/2024 29 (H) 6 - 20 mg/dL Final     Creatinine   Date Value Ref Range Status   03/03/2025 0.70 0.57 - 1.00 mg/dL Final   04/12/2024 0.80 0.60 - 1.30 mg/dL Final     Comment:     Serial Number: [id number]:  [id number]     Sodium   Date Value Ref Range Status   03/03/2025 138 134 - 144 mmol/L Final   03/22/2024 134 (L) 136 - 145 mmol/L Final     Potassium   Date Value Ref Range Status   03/03/2025 4.8 3.5 - 5.2 mmol/L Final   03/22/2024 4.3 3.5 - 5.2 mmol/L Final     Chloride   Date Value Ref Range Status   03/03/2025 103 96 - 106  mmol/L Final   03/22/2024 98 98 - 107 mmol/L Final     CO2   Date Value Ref Range Status   03/22/2024 24.0 22.0 - 29.0 mmol/L Final     Total CO2   Date Value Ref Range Status   03/03/2025 20 20 - 29 mmol/L Final     Calcium   Date Value Ref Range Status   03/03/2025 10.2 8.7 - 10.2 mg/dL Final   03/22/2024 10.0 8.6 - 10.5 mg/dL Final     ALT (SGPT)   Date Value Ref Range Status   03/03/2025 18 0 - 32 IU/L Final     AST (SGOT)   Date Value Ref Range Status   03/03/2025 12 0 - 40 IU/L Final     WBC   Date Value Ref Range Status   03/22/2024 8.65 3.40 - 10.80 10*3/mm3 Final   02/26/2024 8.8 3.4 - 10.8 x10E3/uL Final     Hematocrit   Date Value Ref Range Status   03/22/2024 39.3 34.0 - 46.6 % Final     Platelets   Date Value Ref Range Status   03/22/2024 370 140 - 450 10*3/mm3 Final     Triglycerides   Date Value Ref Range Status   03/03/2025 119 0 - 149 mg/dL Final     HDL Cholesterol   Date Value Ref Range Status   03/03/2025 38 (L) >39 mg/dL Final     LDL Chol Calc (NIH)   Date Value Ref Range Status   03/03/2025 60 0 - 99 mg/dL Final     Hemoglobin A1C   Date Value Ref Range Status   03/03/2025 6.7 (H) 4.8 - 5.6 % Final     Comment:              Prediabetes: 5.7 - 6.4           Diabetes: >6.4           Glycemic control for adults with diabetes: <7.0     07/19/2023 7.8 % Final         Assessment / Plan     Assessment/Plan:    1. Acute frontal sinusitis, recurrence not specified  - cefdinir (OMNICEF) 300 MG capsule; Take 1 capsule by mouth 2 (Two) Times a Day for 7 days.  Dispense: 14 capsule; Refill: 0  - fluticasone (FLONASE) 50 MCG/ACT nasal spray; Administer 1 spray into the nostril(s) as directed by provider Daily.  Dispense: 16 g; Refill: 11  - guaiFENesin (Mucinex) 600 MG 12 hr tablet; Take 2 tablets by mouth 2 (Two) Times a Day.  Dispense: 20 tablet; Refill: 0    Diagnoses and all orders for this visit:    1. Acute frontal sinusitis, recurrence not specified (Primary)  -     cefdinir (OMNICEF) 300 MG capsule;  Take 1 capsule by mouth 2 (Two) Times a Day for 7 days.  Dispense: 14 capsule; Refill: 0  -     fluticasone (FLONASE) 50 MCG/ACT nasal spray; Administer 1 spray into the nostril(s) as directed by provider Daily.  Dispense: 16 g; Refill: 11  -     guaiFENesin (Mucinex) 600 MG 12 hr tablet; Take 2 tablets by mouth 2 (Two) Times a Day.  Dispense: 20 tablet; Refill: 0        Assessment & Plan  1. Upper respiratory sinusitis.  Symptoms include nasal congestion, postnasal drip, pressure above and below the eyes, and she reports when she gets these symptoms it usually causes abdominal discomfort likely due to sinus drainage. There is fluid in the ears and pressure in the frontal and maxillary sinuses. She has not had any fever, cough, diarrhea, nausea, vomiting, hematochezia, or hematuria. Omnicef (cefdinir) 300 mg will be taken twice daily for 7 days. Flonase (fluticasone) nasal spray will be used to help dry up the drainage. Mucinex (guaifenesin) will be taken to alleviate the drainage and associated abdominal discomfort. Prescriptions will be sent to [facility] in [place].    No follow-ups on file. unless patient needs to be seen sooner or acute issues arise.      I have discussed the patient results/orders and and plan/recommendation with them at today's visit.    Patient or patient representative verbalized consent for the use of Ambient Listening during the visit with  YENNY Srinivasan for chart documentation. 3/14/2025  08:25 CDT  YENNY Srinivasan   03/14/2025

## 2025-04-29 RX ORDER — TIRZEPATIDE 7.5 MG/.5ML
7.5 INJECTION, SOLUTION SUBCUTANEOUS WEEKLY
Qty: 2 ML | Refills: 2 | Status: SHIPPED | OUTPATIENT
Start: 2025-04-29

## 2025-04-30 ENCOUNTER — OFFICE VISIT (OUTPATIENT)
Dept: INTERNAL MEDICINE | Facility: CLINIC | Age: 60
End: 2025-04-30
Payer: COMMERCIAL

## 2025-04-30 VITALS
HEIGHT: 61 IN | DIASTOLIC BLOOD PRESSURE: 76 MMHG | HEART RATE: 92 BPM | TEMPERATURE: 96 F | SYSTOLIC BLOOD PRESSURE: 138 MMHG | OXYGEN SATURATION: 95 % | BODY MASS INDEX: 25.07 KG/M2 | WEIGHT: 132.8 LBS

## 2025-04-30 DIAGNOSIS — R53.82 CHRONIC FATIGUE: Primary | ICD-10-CM

## 2025-04-30 DIAGNOSIS — I25.10 MILD CAD: ICD-10-CM

## 2025-04-30 DIAGNOSIS — E11.8 TYPE 2 DIABETES MELLITUS WITH COMPLICATION, WITHOUT LONG-TERM CURRENT USE OF INSULIN: ICD-10-CM

## 2025-04-30 DIAGNOSIS — G89.29 CHRONIC LEFT-SIDED LOW BACK PAIN WITH LEFT-SIDED SCIATICA: ICD-10-CM

## 2025-04-30 DIAGNOSIS — M54.42 CHRONIC LEFT-SIDED LOW BACK PAIN WITH LEFT-SIDED SCIATICA: ICD-10-CM

## 2025-04-30 PROBLEM — R07.89 CHEST PRESSURE: Status: RESOLVED | Noted: 2017-10-23 | Resolved: 2025-04-30

## 2025-04-30 PROCEDURE — 99214 OFFICE O/P EST MOD 30 MIN: CPT | Performed by: FAMILY MEDICINE

## 2025-04-30 NOTE — PROGRESS NOTES
Subjective     Chief Complaint   Patient presents with    Fatigue       Fatigue  Symptoms include fatigue.        Patient's PMR from outside medical facility reviewed and noted.    Catrachita Argueta is a 59 y.o. female who presents for a routine office visit.  Comes in complaining of chronic fatigue.  She states has been going on for the last several months.  On review of her labs I do not see anything present that could be causing her fatigue however we will do additional lab workup to evaluate this at this time including a CBC vitamin D B12 as well as iron levels.  Will also do a tick panel as she reports multiple tick bites.    At this time however I believe her fatigue most likely to be due to low blood sugars and her Mounjaro.  At this time patient is to put her metformin on hold and see how she feels.  If this does not resolve the problem we may back her down to a 5 mg on the Mounjaro at her next refill.  Patient reports no chest pain or other signs and symptoms and states that she is not having any significant depression at this time.    Patient also reports that her neuropathy has been acting up primarily going down her right leg and in her right foot.  We discussed different medications to help with neuropathy she has not done well with the Lyrica and gabapentin in the past we will try some amitriptyline at night especially since she is having some trouble sleeping.  Unsure whether this is actually secondary to her diabetes versus her previously existing degenerative disc disease and sciatica.    Hypertension remains stable at this time.            Past Medical History:   Past Medical History:   Diagnosis Date    Anxiety     Depression     Diabetes mellitus     GA (granuloma annulare)     GERD (gastroesophageal reflux disease)     Hypertension     Low back pain     PAD (peripheral artery disease)      Past Surgical History:  Past Surgical History:   Procedure Laterality Date    AORTAGRAM Left 09/22/2023     Procedure: LEFT LOWER EXTREMITY ANGIOGRAM, BALLOON ANGIOPLASTY MYNX CLOSURE;  Surgeon: Kei Jones DO;  Location: [facility] HYBRID OR 12;  Service: Vascular;  Laterality: Left;    CARDIAC CATHETERIZATION      CARDIAC CATHETERIZATION N/A 3/22/2024    Procedure: Left Heart Cath;  Surgeon: Duncan Nortno DO;  Location: [facility] CATH INVASIVE LOCATION;  Service: Cardiovascular;  Laterality: N/A;     SECTION      COLONOSCOPY N/A 2023    Procedure: COLONOSCOPY WITH ANESTHESIA;  Surgeon: Mushtaq Crews MD;  Location: [facility] ENDOSCOPY;  Service: Gastroenterology;  Laterality: N/A;  preop; diarrhea  postop; polyps; diverticulosis   PCP Ana Hernandez     ILIAC ARTERY STENT Bilateral     IR ANGIOGRAM EXTREMITY UNILATERAL       Social History:  reports that she has been smoking cigarettes. She has a 30 pack-year smoking history. She has been exposed to tobacco smoke. She has never used smokeless tobacco. She reports that she does not drink alcohol and does not use drugs.    Family History: family history includes No Known Problems in her father and mother. She was adopted.      Allergies:  No Known Allergies  Medications:  Prior to Admission medications    Medication Sig Start Date End Date Taking? Authorizing Provider   albuterol sulfate  (90 Base) MCG/ACT inhaler Inhale 2 puffs Every 4 (Four) Hours As Needed for Wheezing. 24  Yes William Ellis MD   aspirin 81 MG EC tablet Take 1 tablet by mouth Daily.   Yes Provider, MD Sanjuana   atorvastatin (LIPITOR) 40 MG tablet Take 1 tablet by mouth Daily. 24  Yes William Ellis MD   ciclopirox (PENLAC) 8 % solution Apply  topically to the appropriate area as directed Every Night. 3/3/25  Yes William Ellis MD   clopidogrel (PLAVIX) 75 MG tablet Take 1 tablet by mouth Daily. 25  Yes William Ellis MD   clotrimazole-betamethasone (LOTRISONE) 1-0.05 % cream Apply 1 Application topically to  "the appropriate area as directed 2 (Two) Times a Day. Apply to affected area bid 6/6/24  Yes William Ellis MD   dextromethorphan polistirex ER (Delsym) 30 MG/5ML Suspension Extended Release oral suspension Take 10 mL by mouth Every 12 (Twelve) Hours. 12/18/24  Yes William Ellis MD   fenofibrate (TRICOR) 145 MG tablet Take 1 tablet by mouth Daily. 2/28/25  Yes William Ellis MD   fluticasone (FLONASE) 50 MCG/ACT nasal spray Administer 1 spray into the nostril(s) as directed by provider Daily. 3/14/25  Yes Lida Blackwood APRN   guaiFENesin (Mucinex) 600 MG 12 hr tablet Take 2 tablets by mouth 2 (Two) Times a Day. 3/14/25  Yes Lida Blackwood APRN   lisinopril-hydrochlorothiazide (PRINZIDE,ZESTORETIC) 20-12.5 MG per tablet Take 1 tablet by mouth 2 (Two) Times a Day. 2/28/25  Yes William Ellis MD   metFORMIN (GLUCOPHAGE) 1000 MG tablet Take 1 tablet by mouth 2 (Two) Times a Day With Meals. 2/28/25  Yes William Ellis MD   omeprazole (priLOSEC) 40 MG capsule Take 1 capsule by mouth Daily. 2/28/25  Yes William Ellis MD   Tirzepatide (Mounjaro) 7.5 MG/0.5ML solution auto-injector Inject 7.5 mg under the skin into the appropriate area as directed 1 (One) Time Per Week. 4/29/25  Yes William Ellis MD   Tirzepatide (MOUNJARO) 7.5 MG/0.5ML solution pen-injector pen Inject 0.5 mL under the skin into the appropriate area as directed 1 (One) Time Per Week. 8/27/24  Yes William Ellis MD   vitamin D (ERGOCALCIFEROL) 1.25 MG (70888 UT) capsule capsule Take 1 capsule by mouth 1 (One) Time Per Week. 3/3/25  Yes William Ellis MD             Review of systems   negative unless otherwise specified above in HPI    Objective     Vital Signs: /76 (BP Location: Left arm, Patient Position: Sitting, Cuff Size: Adult)   Pulse 92   Temp 96 °F (35.6 °C)   Ht 154.9 cm (61\")   Wt 60.2 kg (132 lb 12.8 oz)   LMP  (LMP Unknown) " Comment: 2015ish  SpO2 95%   BMI 25.09 kg/m²     Physical Exam  Vitals and nursing note reviewed.   Constitutional:       General: She is not in acute distress.     Appearance: Normal appearance.   HENT:      Head: Normocephalic.   Eyes:      Extraocular Movements: Extraocular movements intact.      Pupils: Pupils are equal, round, and reactive to light.   Cardiovascular:      Rate and Rhythm: Normal rate and regular rhythm.      Heart sounds: Normal heart sounds. No murmur heard.  Pulmonary:      Effort: Pulmonary effort is normal. No respiratory distress.      Breath sounds: Normal breath sounds. No rhonchi or rales.   Abdominal:      General: Abdomen is flat. Bowel sounds are normal.      Palpations: Abdomen is soft.   Neurological:      General: No focal deficit present.      Mental Status: She is alert.                Results Reviewed:  Glucose   Date Value Ref Range Status   03/03/2025 104 (H) 70 - 99 mg/dL Final   03/22/2024 311 (H) 65 - 99 mg/dL Final     BUN   Date Value Ref Range Status   03/03/2025 20 6 - 24 mg/dL Final   03/22/2024 29 (H) 6 - 20 mg/dL Final     Creatinine   Date Value Ref Range Status   03/03/2025 0.70 0.57 - 1.00 mg/dL Final   04/12/2024 0.80 0.60 - 1.30 mg/dL Final     Comment:     Serial Number: [id number]:  [id number]     Sodium   Date Value Ref Range Status   03/03/2025 138 134 - 144 mmol/L Final   03/22/2024 134 (L) 136 - 145 mmol/L Final     Potassium   Date Value Ref Range Status   03/03/2025 4.8 3.5 - 5.2 mmol/L Final   03/22/2024 4.3 3.5 - 5.2 mmol/L Final     Chloride   Date Value Ref Range Status   03/03/2025 103 96 - 106 mmol/L Final   03/22/2024 98 98 - 107 mmol/L Final     CO2   Date Value Ref Range Status   03/22/2024 24.0 22.0 - 29.0 mmol/L Final     Total CO2   Date Value Ref Range Status   03/03/2025 20 20 - 29 mmol/L Final     Calcium   Date Value Ref Range Status   03/03/2025 10.2 8.7 - 10.2 mg/dL Final   03/22/2024 10.0 8.6 - 10.5 mg/dL Final     ALT (SGPT)   Date  Value Ref Range Status   03/03/2025 18 0 - 32 IU/L Final     AST (SGOT)   Date Value Ref Range Status   03/03/2025 12 0 - 40 IU/L Final     WBC   Date Value Ref Range Status   03/22/2024 8.65 3.40 - 10.80 10*3/mm3 Final   02/26/2024 8.8 3.4 - 10.8 x10E3/uL Final     Hematocrit   Date Value Ref Range Status   03/22/2024 39.3 34.0 - 46.6 % Final     Platelets   Date Value Ref Range Status   03/22/2024 370 140 - 450 10*3/mm3 Final     Triglycerides   Date Value Ref Range Status   03/03/2025 119 0 - 149 mg/dL Final     HDL Cholesterol   Date Value Ref Range Status   03/03/2025 38 (L) >39 mg/dL Final     LDL Chol Calc (NIH)   Date Value Ref Range Status   03/03/2025 60 0 - 99 mg/dL Final     Hemoglobin A1C   Date Value Ref Range Status   03/03/2025 6.7 (H) 4.8 - 5.6 % Final     Comment:              Prediabetes: 5.7 - 6.4           Diabetes: >6.4           Glycemic control for adults with diabetes: <7.0     07/19/2023 7.8 % Final             Procedure   Procedures       Assessment / Plan     Assessment/Plan:   Diagnosis Plan   1. Chronic fatigue  CBC and Differential    Comprehensive metabolic panel    Iron    Vitamin B12    Vitamin D 1,25 dihydroxy    Lyme Disease Total Antibody With Reflex to Immunoassay    Ehrlichia Profile DNA PCR    Rickettsia Species DNA, Real-Time PCR    amitriptyline (ELAVIL) 25 MG tablet      2. Type 2 diabetes mellitus with complication, without long-term current use of insulin        3. Mild CAD        4. Chronic left-sided low back pain with left-sided sciatica              Return in about 4 weeks (around 5/28/2025) for Recheck. unless patient needs to be seen sooner or acute issues arise.      I have discussed the patient results/orders and and plan/recommendation with them at today's visit.      Signed by:    William Ellis MD Date: 04/30/25

## 2025-05-04 LAB
1,25(OH)2D SERPL-MCNC: 41.7 PG/ML (ref 24.8–81.5)
A PHAGOCYTOPH DNA BLD QL NAA+PROBE: NEGATIVE
ALBUMIN SERPL-MCNC: 4.6 G/DL (ref 3.8–4.9)
ALP SERPL-CCNC: 38 IU/L (ref 44–121)
ALT SERPL-CCNC: 22 IU/L (ref 0–32)
AST SERPL-CCNC: 20 IU/L (ref 0–40)
B BURGDOR IGG+IGM SER QL IA: NEGATIVE
BASOPHILS # BLD AUTO: 0.1 X10E3/UL (ref 0–0.2)
BASOPHILS NFR BLD AUTO: 1 %
BILIRUB SERPL-MCNC: 0.3 MG/DL (ref 0–1.2)
BUN SERPL-MCNC: 24 MG/DL (ref 6–24)
BUN/CREAT SERPL: 29 (ref 9–23)
CALCIUM SERPL-MCNC: 10.3 MG/DL (ref 8.7–10.2)
CHLORIDE SERPL-SCNC: 91 MMOL/L (ref 96–106)
CO2 SERPL-SCNC: 19 MMOL/L (ref 20–29)
CREAT SERPL-MCNC: 0.84 MG/DL (ref 0.57–1)
EGFRCR SERPLBLD CKD-EPI 2021: 80 ML/MIN/1.73
EHRLICHIA DNA SPEC QL NAA+PROBE: NEGATIVE
EOSINOPHIL # BLD AUTO: 0.3 X10E3/UL (ref 0–0.4)
EOSINOPHIL NFR BLD AUTO: 3 %
ERYTHROCYTE [DISTWIDTH] IN BLOOD BY AUTOMATED COUNT: 12.6 % (ref 11.7–15.4)
GLOBULIN SER CALC-MCNC: 2.7 G/DL (ref 1.5–4.5)
GLUCOSE SERPL-MCNC: 105 MG/DL (ref 70–99)
HCT VFR BLD AUTO: 41.5 % (ref 34–46.6)
HGB BLD-MCNC: 13.7 G/DL (ref 11.1–15.9)
IMM GRANULOCYTES # BLD AUTO: 0.1 X10E3/UL (ref 0–0.1)
IMM GRANULOCYTES NFR BLD AUTO: 1 %
IRON SERPL-MCNC: 38 UG/DL (ref 27–159)
LYMPHOCYTES # BLD AUTO: 3 X10E3/UL (ref 0.7–3.1)
LYMPHOCYTES NFR BLD AUTO: 27 %
MCH RBC QN AUTO: 27.9 PG (ref 26.6–33)
MCHC RBC AUTO-ENTMCNC: 33 G/DL (ref 31.5–35.7)
MCV RBC AUTO: 85 FL (ref 79–97)
MONOCYTES # BLD AUTO: 0.7 X10E3/UL (ref 0.1–0.9)
MONOCYTES NFR BLD AUTO: 6 %
NEUTROPHILS # BLD AUTO: 7 X10E3/UL (ref 1.4–7)
NEUTROPHILS NFR BLD AUTO: 62 %
PLATELET # BLD AUTO: 448 X10E3/UL (ref 150–450)
POTASSIUM SERPL-SCNC: 4.7 MMOL/L (ref 3.5–5.2)
PROT SERPL-MCNC: 7.3 G/DL (ref 6–8.5)
RBC # BLD AUTO: 4.91 X10E6/UL (ref 3.77–5.28)
RICKETTSIA RICKETTSII DNA, RT: NOT DETECTED
SODIUM SERPL-SCNC: 129 MMOL/L (ref 134–144)
VIT B12 SERPL-MCNC: 609 PG/ML (ref 232–1245)
WBC # BLD AUTO: 11.1 X10E3/UL (ref 3.4–10.8)

## 2025-05-05 DIAGNOSIS — M79.671 RIGHT FOOT PAIN: Primary | ICD-10-CM

## 2025-05-05 RX ORDER — IBUPROFEN 800 MG/1
800 TABLET, FILM COATED ORAL EVERY 8 HOURS PRN
Qty: 90 TABLET | Refills: 11 | Status: SHIPPED | OUTPATIENT
Start: 2025-05-05

## 2025-06-02 ENCOUNTER — OFFICE VISIT (OUTPATIENT)
Dept: INTERNAL MEDICINE | Facility: CLINIC | Age: 60
End: 2025-06-02
Payer: COMMERCIAL

## 2025-06-02 VITALS
DIASTOLIC BLOOD PRESSURE: 78 MMHG | HEART RATE: 88 BPM | BODY MASS INDEX: 25.11 KG/M2 | OXYGEN SATURATION: 98 % | TEMPERATURE: 97 F | SYSTOLIC BLOOD PRESSURE: 136 MMHG | HEIGHT: 61 IN | WEIGHT: 133 LBS

## 2025-06-02 DIAGNOSIS — E11.8 TYPE 2 DIABETES MELLITUS WITH COMPLICATION, WITHOUT LONG-TERM CURRENT USE OF INSULIN: Primary | ICD-10-CM

## 2025-06-02 DIAGNOSIS — Z87.891 PERSONAL HISTORY OF NICOTINE DEPENDENCE: ICD-10-CM

## 2025-06-02 DIAGNOSIS — Z12.2 SCREENING FOR LUNG CANCER: ICD-10-CM

## 2025-06-02 DIAGNOSIS — B35.1 TOENAIL FUNGUS: ICD-10-CM

## 2025-06-02 PROCEDURE — 99214 OFFICE O/P EST MOD 30 MIN: CPT | Performed by: FAMILY MEDICINE

## 2025-06-02 PROCEDURE — G0296 VISIT TO DETERM LDCT ELIG: HCPCS | Performed by: FAMILY MEDICINE

## 2025-06-02 RX ORDER — EFINACONAZOLE 100 MG/ML
1 SOLUTION TOPICAL DAILY
Qty: 8 ML | Refills: 11 | Status: SHIPPED | OUTPATIENT
Start: 2025-06-02

## 2025-06-02 NOTE — PROGRESS NOTES
Subjective     Chief Complaint   Patient presents with    Shoulder Pain    Fatigue       History of Present Illness    Patient's PMR from outside medical facility reviewed and noted.    Catrachita Argueta is a 59 y.o. female who presents for a routine office visit.  Patient presents for follow up of Diabetes type 2. Current symptoms include: paresthesia of the feet. Patient denies polydipsia and polyuria.  Home sugars: BGs consistently in an acceptable range. Current treatments: discontinued metformin. Patient is due for her diabetic labs at this time, so we will go ahead and order Cmp and HBa1c for evaluation.     She states that her shoulder is doing well.   Did not tolerate the elavil for nerve pain.      Lisa has toenail fungus cannot take terbinifine oral as it intereacts with her other meds, also did not do well on topical cicloprox.  Topical ciclopirox tended to burn her toenails and cause pain.  Will try jublia instead.      Lung Cancer Shared decision making Documentation:    she  reports that she has been smoking cigarettes. She has a 30 pack-year smoking history. She has been exposed to tobacco smoke. She has never used smokeless tobacco. She reports that she does not drink alcohol and does not use drugs..  Based on the recommendation of the [place] Preventive Services Task Force, this patient is at high risk for lung cancer and a low-dose CT screening scan is recommended.     The patient has had no hemoptysis, unintentional weight loss or increasing shortness of breath. The patient is asymptomatic and has no signs or symptoms of lung cancer.     Together we discussed the potential benefits and potential harms of being screened for lung cancer including the potential for follow up diagnostic testing, risk for over diagnosis, false positive rate and radiation exposure using the [facility] Lung Cancer Screening Shared Decision-Making Tool.   We also reviewed the patient's smoking history and  counseled her on the importance and health benefits of stopping the use of tobacco products.      We discussed the NCCN guidelines for lung cancer screening and the patient verbalized understanding that annual screening is recommended until fifteen years beyond smoking as long as they have no other disease or comorbidity that would prevent them from receiving cancer treatments such as surgery should a lung cancer be detected.  After review of the NCCN guidelines and recommendations for ongoing screening, the patient verbalized understanding of recommendations for follow-up.  The patient has decided to proceed with a Low Dose Lung Cancer Screening CT today         Past Medical History:   Past Medical History:   Diagnosis Date    Anxiety     Depression     Diabetes mellitus     GA (granuloma annulare)     GERD (gastroesophageal reflux disease)     Hypertension     Low back pain     PAD (peripheral artery disease)      Past Surgical History:  Past Surgical History:   Procedure Laterality Date    AORTOGRAM Left 2023    Procedure: LEFT LOWER EXTREMITY ANGIOGRAM, BALLOON ANGIOPLASTY MYNX CLOSURE;  Surgeon: Kei Jones DO;  Location: [facility] HYBRID OR 12;  Service: Vascular;  Laterality: Left;    CARDIAC CATHETERIZATION      CARDIAC CATHETERIZATION N/A 3/22/2024    Procedure: Left Heart Cath;  Surgeon: Duncan Norton DO;  Location: [facility] CATH INVASIVE LOCATION;  Service: Cardiovascular;  Laterality: N/A;     SECTION      COLONOSCOPY N/A 2023    Procedure: COLONOSCOPY WITH ANESTHESIA;  Surgeon: Mushtaq Crews MD;  Location: [facility] ENDOSCOPY;  Service: Gastroenterology;  Laterality: N/A;  preop; diarrhea  postop; polyps; diverticulosis   PCP Ana Hernandez     ILIAC ARTERY STENT Bilateral     IR ANGIOGRAM EXTREMITY UNILATERAL       Social History:  reports that she has been smoking cigarettes. She has a 30 pack-year smoking history. She has been exposed to tobacco smoke. She has never  used smokeless tobacco. She reports that she does not drink alcohol and does not use drugs.    Family History: family history includes No Known Problems in her father and mother. She was adopted.      Allergies:  No Known Allergies  Medications:  Prior to Admission medications    Medication Sig Start Date End Date Taking? Authorizing Provider   albuterol sulfate  (90 Base) MCG/ACT inhaler Inhale 2 puffs Every 4 (Four) Hours As Needed for Wheezing. 12/18/24  Yes William Ellis MD   aspirin 81 MG EC tablet Take 1 tablet by mouth Daily.   Yes ProviderSanjuana MD   atorvastatin (LIPITOR) 40 MG tablet Take 1 tablet by mouth Daily. 11/4/24  Yes William Ellis MD   ciclopirox (PENLAC) 8 % solution Apply  topically to the appropriate area as directed Every Night. 3/3/25  Yes William Ellis MD   clopidogrel (PLAVIX) 75 MG tablet Take 1 tablet by mouth Daily. 2/28/25  Yes William Ellis MD   clotrimazole-betamethasone (LOTRISONE) 1-0.05 % cream Apply 1 Application topically to the appropriate area as directed 2 (Two) Times a Day. Apply to affected area bid 6/6/24  Yes William Ellis MD   dextromethorphan polistirex ER (Delsym) 30 MG/5ML Suspension Extended Release oral suspension Take 10 mL by mouth Every 12 (Twelve) Hours. 12/18/24  Yes William Ellis MD   fenofibrate (TRICOR) 145 MG tablet Take 1 tablet by mouth Daily. 2/28/25  Yes William Ellis MD   fluticasone (FLONASE) 50 MCG/ACT nasal spray Administer 1 spray into the nostril(s) as directed by provider Daily. 3/14/25  Yes Lida Blackwood APRN   guaiFENesin (Mucinex) 600 MG 12 hr tablet Take 2 tablets by mouth 2 (Two) Times a Day. 3/14/25  Yes Lida Blackwood APRN   lisinopril-hydrochlorothiazide (PRINZIDE,ZESTORETIC) 20-12.5 MG per tablet Take 1 tablet by mouth 2 (Two) Times a Day. 2/28/25  Yes William Ellis MD   metFORMIN (GLUCOPHAGE) 1000 MG tablet Take 1 tablet  "by mouth 2 (Two) Times a Day With Meals. 2/28/25  Yes William Ellis MD   omeprazole (priLOSEC) 40 MG capsule Take 1 capsule by mouth Daily. 2/28/25  Yes William Ellis MD   Tirzepatide (Mounjaro) 7.5 MG/0.5ML solution auto-injector Inject 7.5 mg under the skin into the appropriate area as directed 1 (One) Time Per Week. 4/29/25  Yes William Ellis MD   Tirzepatide (MOUNJARO) 7.5 MG/0.5ML solution pen-injector pen Inject 0.5 mL under the skin into the appropriate area as directed 1 (One) Time Per Week. 8/27/24  Yes William Ellis MD   vitamin D (ERGOCALCIFEROL) 1.25 MG (86823 UT) capsule capsule Take 1 capsule by mouth 1 (One) Time Per Week. 3/3/25  Yes William Ellis MD             Review of systems   negative unless otherwise specified above in HPI    Objective     Vital Signs: /78 (BP Location: Right arm, Patient Position: Sitting, Cuff Size: Adult)   Pulse 88   Temp 97 °F (36.1 °C) (Infrared)   Ht 154.9 cm (61\")   Wt 60.3 kg (133 lb)   LMP  (LMP Unknown) Comment: 2015ish  SpO2 98%   BMI 25.13 kg/m²     Physical Exam  Vitals and nursing note reviewed.   Constitutional:       General: She is not in acute distress.     Appearance: Normal appearance.   HENT:      Head: Normocephalic.   Eyes:      Extraocular Movements: Extraocular movements intact.      Pupils: Pupils are equal, round, and reactive to light.   Cardiovascular:      Rate and Rhythm: Normal rate and regular rhythm.      Heart sounds: Normal heart sounds. No murmur heard.  Pulmonary:      Effort: Pulmonary effort is normal. No respiratory distress.      Breath sounds: Normal breath sounds. No rhonchi or rales.   Abdominal:      General: Abdomen is flat. Bowel sounds are normal.      Palpations: Abdomen is soft.   Neurological:      General: No focal deficit present.      Mental Status: She is alert.                Results Reviewed:  Glucose   Date Value Ref Range Status   04/30/2025 105 " (H) 70 - 99 mg/dL Final   03/22/2024 311 (H) 65 - 99 mg/dL Final     BUN   Date Value Ref Range Status   04/30/2025 24 6 - 24 mg/dL Final   03/22/2024 29 (H) 6 - 20 mg/dL Final     Creatinine   Date Value Ref Range Status   04/30/2025 0.84 0.57 - 1.00 mg/dL Final   04/12/2024 0.80 0.60 - 1.30 mg/dL Final     Comment:     Serial Number: [id number]:  [id number]     Sodium   Date Value Ref Range Status   04/30/2025 129 (L) 134 - 144 mmol/L Final   03/22/2024 134 (L) 136 - 145 mmol/L Final     Potassium   Date Value Ref Range Status   04/30/2025 4.7 3.5 - 5.2 mmol/L Final   03/22/2024 4.3 3.5 - 5.2 mmol/L Final     Chloride   Date Value Ref Range Status   04/30/2025 91 (L) 96 - 106 mmol/L Final   03/22/2024 98 98 - 107 mmol/L Final     CO2   Date Value Ref Range Status   03/22/2024 24.0 22.0 - 29.0 mmol/L Final     Total CO2   Date Value Ref Range Status   04/30/2025 19 (L) 20 - 29 mmol/L Final     Calcium   Date Value Ref Range Status   04/30/2025 10.3 (H) 8.7 - 10.2 mg/dL Final   03/22/2024 10.0 8.6 - 10.5 mg/dL Final     ALT (SGPT)   Date Value Ref Range Status   04/30/2025 22 0 - 32 IU/L Final     AST (SGOT)   Date Value Ref Range Status   04/30/2025 20 0 - 40 IU/L Final     WBC   Date Value Ref Range Status   04/30/2025 11.1 (H) 3.4 - 10.8 x10E3/uL Final     Hematocrit   Date Value Ref Range Status   04/30/2025 41.5 34.0 - 46.6 % Final   03/22/2024 39.3 34.0 - 46.6 % Final     Platelets   Date Value Ref Range Status   04/30/2025 448 150 - 450 x10E3/uL Final   03/22/2024 370 140 - 450 10*3/mm3 Final     Triglycerides   Date Value Ref Range Status   03/03/2025 119 0 - 149 mg/dL Final     HDL Cholesterol   Date Value Ref Range Status   03/03/2025 38 (L) >39 mg/dL Final     LDL Chol Calc ([facility])   Date Value Ref Range Status   03/03/2025 60 0 - 99 mg/dL Final     Hemoglobin A1C   Date Value Ref Range Status   03/03/2025 6.7 (H) 4.8 - 5.6 % Final     Comment:              Prediabetes: 5.7 - 6.4           Diabetes: >6.4            Glycemic control for adults with diabetes: <7.0     07/19/2023 7.8 % Final             Procedure   Procedures       Assessment / Plan     Assessment/Plan:   Diagnosis Plan   1. Type 2 diabetes mellitus with complication, without long-term current use of insulin  Comprehensive Metabolic Panel    Hemoglobin A1c      2. Screening for lung cancer   CT Chest Low Dose Cancer Screening [facility]      3. Personal history of nicotine dependence   CT Chest Low Dose Cancer Screening WO      4. Toenail fungus  Efinaconazole (Jublia) 10 % solution              Return in about 6 months (around 12/2/2025) for Recheck. unless patient needs to be seen sooner or acute issues arise.      I have discussed the patient results/orders and and plan/recommendation with them at today's visit.      Signed by:    William Ellis MD Date: 06/02/25

## 2025-06-03 LAB
ALBUMIN SERPL-MCNC: 4.3 G/DL (ref 3.8–4.9)
ALP SERPL-CCNC: 38 IU/L (ref 44–121)
ALT SERPL-CCNC: 18 IU/L (ref 0–32)
AST SERPL-CCNC: 15 IU/L (ref 0–40)
BILIRUB SERPL-MCNC: <0.2 MG/DL (ref 0–1.2)
BUN SERPL-MCNC: 17 MG/DL (ref 6–24)
BUN/CREAT SERPL: 19 (ref 9–23)
CALCIUM SERPL-MCNC: 10.1 MG/DL (ref 8.7–10.2)
CHLORIDE SERPL-SCNC: 98 MMOL/L (ref 96–106)
CO2 SERPL-SCNC: 21 MMOL/L (ref 20–29)
CREAT SERPL-MCNC: 0.88 MG/DL (ref 0.57–1)
EGFRCR SERPLBLD CKD-EPI 2021: 76 ML/MIN/1.73
GLOBULIN SER CALC-MCNC: 2.8 G/DL (ref 1.5–4.5)
GLUCOSE SERPL-MCNC: 128 MG/DL (ref 70–99)
HBA1C MFR BLD: 7.2 % (ref 4.8–5.6)
POTASSIUM SERPL-SCNC: 5.4 MMOL/L (ref 3.5–5.2)
PROT SERPL-MCNC: 7.1 G/DL (ref 6–8.5)
SODIUM SERPL-SCNC: 134 MMOL/L (ref 134–144)

## 2025-06-26 ENCOUNTER — HOSPITAL ENCOUNTER (OUTPATIENT)
Dept: CT IMAGING | Facility: HOSPITAL | Age: 60
Discharge: HOME | End: 2025-06-26
Admitting: FAMILY MEDICINE
Payer: COMMERCIAL

## 2025-06-26 DIAGNOSIS — Z87.891 PERSONAL HISTORY OF NICOTINE DEPENDENCE: ICD-10-CM

## 2025-06-26 DIAGNOSIS — Z12.2 SCREENING FOR LUNG CANCER: ICD-10-CM

## 2025-06-26 PROCEDURE — 71271 CT THORAX LUNG CANCER SCR C-: CPT

## 2025-07-22 DIAGNOSIS — E55.9 VITAMIN D DEFICIENCY: ICD-10-CM

## 2025-07-22 DIAGNOSIS — E11.65 TYPE 2 DIABETES MELLITUS WITH HYPERGLYCEMIA, WITHOUT LONG-TERM CURRENT USE OF INSULIN: ICD-10-CM

## 2025-07-22 RX ORDER — TIRZEPATIDE 7.5 MG/.5ML
7.5 INJECTION, SOLUTION SUBCUTANEOUS WEEKLY
Qty: 2 ML | Refills: 2 | Status: SHIPPED | OUTPATIENT
Start: 2025-07-22

## 2025-07-22 RX ORDER — ERGOCALCIFEROL 1.25 MG/1
50000 CAPSULE, LIQUID FILLED ORAL WEEKLY
Qty: 5 CAPSULE | Refills: 3 | Status: SHIPPED | OUTPATIENT
Start: 2025-07-22

## (undated) DEVICE — PINNACLE INTRODUCER SHEATH: Brand: PINNACLE

## (undated) DEVICE — RADIFOCUS OPTITORQUE ANGIOGRAPHIC CATHETER: Brand: OPTITORQUE

## (undated) DEVICE — CATH F5 INF JL 4 100CM: Brand: INFINITI

## (undated) DEVICE — PAD, DEFIB, ADULT, RADIOTRANS, PHYSIO: Brand: MEDLINE

## (undated) DEVICE — GLIDESHEATH SLENDER STAINLESS STEEL KIT: Brand: GLIDESHEATH SLENDER

## (undated) DEVICE — PK CATH CARD 30 CA/4

## (undated) DEVICE — CANN NASL ETCO2 LO/FLO A/

## (undated) DEVICE — MODEL BT2000 P/N 700287-012KIT CONTENTS: MANIFOLD WITH SALINE AND CONTRAST PORTS, SALINE TUBING WITH SPIKE AND HAND SYRINGE, TRANSDUCER: Brand: BT2000 AUTOMATED MANIFOLD KIT

## (undated) DEVICE — SOL IRR NACL 0.9PCT BT 1000ML

## (undated) DEVICE — MODEL AT P65, P/N 701554-001KIT CONTENTS: HAND CONTROLLER, 3-WAY HIGH-PRESSURE STOPCOCK WITH ROTATING END AND PREMIUM HIGH-PRESSURE TUBING: Brand: ANGIOTOUCH® KIT

## (undated) DEVICE — ANGIO-SEAL VIP VASCULAR CLOSURE DEVICE: Brand: ANGIO-SEAL

## (undated) DEVICE — GW STARTER FXD CORE J .035 3X150CM 3MM

## (undated) DEVICE — CATH F5 INF PIG145 110CM 6SH: Brand: INFINITI

## (undated) DEVICE — CATH F5 INF JR 4 100CM: Brand: INFINITI

## (undated) DEVICE — DRSNG SURESITE WNDW 4X4.5

## (undated) DEVICE — TR BAND RADIAL ARTERY COMPRESSION DEVICE: Brand: TR BAND

## (undated) DEVICE — CATH F5 INF 3DRC 100CM: Brand: INFINITI

## (undated) DEVICE — A2000 MULTI-USE SYRINGE KIT, P/N 701277-003KIT CONTENTS: 100ML CONTRAST RESERVOIR AND TUBING WITH CONTRAST SPIKE AND CLAMP: Brand: A2000 MULTI-USE SYRINGE KIT

## (undated) DEVICE — DRAPE,ANGIO,BRACH,STERILE,38X44: Brand: MEDLINE

## (undated) DEVICE — SUP ARMBRD ART/LINE BLU

## (undated) DEVICE — GW STARTER FXD CORE J .035 3X260CM 3MM

## (undated) DEVICE — SOLIDIFIER LIQUI LOC PLUS 2000CC